# Patient Record
Sex: FEMALE | Race: WHITE | NOT HISPANIC OR LATINO | URBAN - METROPOLITAN AREA
[De-identification: names, ages, dates, MRNs, and addresses within clinical notes are randomized per-mention and may not be internally consistent; named-entity substitution may affect disease eponyms.]

---

## 2017-07-25 ENCOUNTER — HOSPITAL ENCOUNTER (OUTPATIENT)
Facility: MEDICAL CENTER | Age: 47
End: 2017-07-25
Attending: PHYSICIAN ASSISTANT
Payer: MEDICAID

## 2017-07-25 ENCOUNTER — OFFICE VISIT (OUTPATIENT)
Dept: MEDICAL GROUP | Facility: CLINIC | Age: 47
End: 2017-07-25
Payer: MEDICAID

## 2017-07-25 VITALS
SYSTOLIC BLOOD PRESSURE: 142 MMHG | TEMPERATURE: 97.3 F | DIASTOLIC BLOOD PRESSURE: 84 MMHG | BODY MASS INDEX: 34.78 KG/M2 | HEIGHT: 62 IN | HEART RATE: 80 BPM | RESPIRATION RATE: 16 BRPM | WEIGHT: 189 LBS | OXYGEN SATURATION: 97 %

## 2017-07-25 DIAGNOSIS — Z12.31 SCREENING MAMMOGRAM, ENCOUNTER FOR: ICD-10-CM

## 2017-07-25 DIAGNOSIS — K25.9 GASTRIC ULCER WITHOUT HEMORRHAGE OR PERFORATION, UNSPECIFIED CHRONICITY: ICD-10-CM

## 2017-07-25 LAB
ALBUMIN SERPL BCP-MCNC: 4 G/DL (ref 3.2–4.9)
ALBUMIN/GLOB SERPL: 1.1 G/DL
ALP SERPL-CCNC: 76 U/L (ref 30–99)
ALT SERPL-CCNC: 24 U/L (ref 2–50)
ANION GAP SERPL CALC-SCNC: 13 MMOL/L (ref 0–11.9)
AST SERPL-CCNC: 24 U/L (ref 12–45)
BILIRUB SERPL-MCNC: 1 MG/DL (ref 0.1–1.5)
BUN SERPL-MCNC: 12 MG/DL (ref 8–22)
CALCIUM SERPL-MCNC: 9.8 MG/DL (ref 8.5–10.5)
CHLORIDE SERPL-SCNC: 101 MMOL/L (ref 96–112)
CHOLEST SERPL-MCNC: 158 MG/DL (ref 100–199)
CO2 SERPL-SCNC: 24 MMOL/L (ref 20–33)
CREAT SERPL-MCNC: 0.56 MG/DL (ref 0.5–1.4)
CREAT UR-MCNC: 217.4 MG/DL
ERYTHROCYTE [DISTWIDTH] IN BLOOD BY AUTOMATED COUNT: 39.1 FL (ref 35.9–50)
GFR SERPL CREATININE-BSD FRML MDRD: >60 ML/MIN/1.73 M 2
GLOBULIN SER CALC-MCNC: 3.8 G/DL (ref 1.9–3.5)
GLUCOSE SERPL-MCNC: 224 MG/DL (ref 65–99)
HBA1C MFR BLD: 10.6 % (ref ?–5.8)
HCT VFR BLD AUTO: 47.6 % (ref 37–47)
HDLC SERPL-MCNC: 44 MG/DL
HGB BLD-MCNC: 16 G/DL (ref 12–16)
INT CON NEG: NEGATIVE
INT CON POS: POSITIVE
LDLC SERPL CALC-MCNC: 40 MG/DL
MCH RBC QN AUTO: 30.5 PG (ref 27–33)
MCHC RBC AUTO-ENTMCNC: 33.6 G/DL (ref 33.6–35)
MCV RBC AUTO: 90.8 FL (ref 81.4–97.8)
MICROALBUMIN UR-MCNC: 7.3 MG/DL
MICROALBUMIN/CREAT UR: 34 MG/G (ref 0–30)
PLATELET # BLD AUTO: 305 K/UL (ref 164–446)
PMV BLD AUTO: 10.9 FL (ref 9–12.9)
POTASSIUM SERPL-SCNC: 4 MMOL/L (ref 3.6–5.5)
PROT SERPL-MCNC: 7.8 G/DL (ref 6–8.2)
RBC # BLD AUTO: 5.24 M/UL (ref 4.2–5.4)
SODIUM SERPL-SCNC: 138 MMOL/L (ref 135–145)
TRIGL SERPL-MCNC: 368 MG/DL (ref 0–149)
WBC # BLD AUTO: 9.1 K/UL (ref 4.8–10.8)

## 2017-07-25 PROCEDURE — 82043 UR ALBUMIN QUANTITATIVE: CPT

## 2017-07-25 PROCEDURE — 80053 COMPREHEN METABOLIC PANEL: CPT

## 2017-07-25 PROCEDURE — 83036 HEMOGLOBIN GLYCOSYLATED A1C: CPT | Performed by: PHYSICIAN ASSISTANT

## 2017-07-25 PROCEDURE — 99204 OFFICE O/P NEW MOD 45 MIN: CPT | Performed by: PHYSICIAN ASSISTANT

## 2017-07-25 PROCEDURE — 82570 ASSAY OF URINE CREATININE: CPT

## 2017-07-25 PROCEDURE — 80061 LIPID PANEL: CPT

## 2017-07-25 PROCEDURE — 85027 COMPLETE CBC AUTOMATED: CPT

## 2017-07-25 RX ORDER — PRAVASTATIN SODIUM 10 MG
10 TABLET ORAL
Qty: 30 TAB | Refills: 11 | Status: SHIPPED | OUTPATIENT
Start: 2017-07-25 | End: 2017-07-25 | Stop reason: SDUPTHER

## 2017-07-25 RX ORDER — LISINOPRIL 10 MG/1
10 TABLET ORAL EVERY MORNING
Qty: 30 TAB | Refills: 5 | Status: SHIPPED | OUTPATIENT
Start: 2017-07-25 | End: 2017-07-25 | Stop reason: SDUPTHER

## 2017-07-25 RX ORDER — PRAVASTATIN SODIUM 10 MG
10 TABLET ORAL
Qty: 30 TAB | Refills: 11 | Status: SHIPPED | OUTPATIENT
Start: 2017-07-25

## 2017-07-25 RX ORDER — OMEPRAZOLE 20 MG/1
20 TABLET, DELAYED RELEASE ORAL EVERY MORNING
Qty: 30 TAB | Refills: 5 | Status: SHIPPED | OUTPATIENT
Start: 2017-07-25 | End: 2017-07-25 | Stop reason: SDUPTHER

## 2017-07-25 RX ORDER — OMEPRAZOLE 20 MG/1
20 TABLET, DELAYED RELEASE ORAL EVERY MORNING
Qty: 30 TAB | Refills: 5 | Status: SHIPPED | OUTPATIENT
Start: 2017-07-25 | End: 2017-08-22

## 2017-07-25 RX ORDER — LANCETS 30 GAUGE
EACH MISCELLANEOUS
Qty: 120 EACH | Refills: 2 | Status: SHIPPED | OUTPATIENT
Start: 2017-07-25

## 2017-07-25 RX ORDER — LISINOPRIL 10 MG/1
10 TABLET ORAL EVERY MORNING
Qty: 30 TAB | Refills: 5 | Status: SHIPPED | OUTPATIENT
Start: 2017-07-25 | End: 2018-01-26 | Stop reason: SDUPTHER

## 2017-07-25 RX ORDER — LANCETS 30 GAUGE
EACH MISCELLANEOUS
Qty: 120 EACH | Refills: 2 | Status: SHIPPED | OUTPATIENT
Start: 2017-07-25 | End: 2017-07-25 | Stop reason: SDUPTHER

## 2017-07-25 ASSESSMENT — PATIENT HEALTH QUESTIONNAIRE - PHQ9: CLINICAL INTERPRETATION OF PHQ2 SCORE: 0

## 2017-07-25 NOTE — MR AVS SNAPSHOT
"        Namita Tsai   2017 1:00 PM   Office Visit   MRN: [id number]    Department:  [facility] Phone:  [phone]    Description:  Female : 1970   Provider:  Sylvia Ponce PA-C           Reason for Visit     New Patient diabetes type II / hypertension      Allergies as of 2017     Allergen Noted Reactions    Pcn [Penicillins] 2017         You were diagnosed with     Uncontrolled type 2 diabetes mellitus with complication, without long-term current use of insulin (CMS-HCC)   [8939885]       Screening mammogram, encounter for   [7624825]       Gastric ulcer without hemorrhage or perforation, unspecified chronicity   [1855486]         Vital Signs     Blood Pressure Pulse Temperature Respirations Height Weight    142/84 mmHg 80 36.3 °C (97.3 °F) 16 1.565 m (5' 1.61\") 85.73 kg (189 lb)    Body Mass Index Oxygen Saturation Last Menstrual Period Smoking Status          35.00 kg/m2 97% 2007 Former Smoker        Basic Information     Date Of Birth Sex Race Ethnicity Preferred Language    1970 Female White Non- English      Problem List              ICD-10-CM Priority Class Noted - Resolved    Uncontrolled type 2 diabetes mellitus with complication, without long-term current use of insulin (CMS-HCC) E11.8, E11.65   2017 - Present    Gastric ulcer without hemorrhage or perforation K25.9   2017 - Present      Health Maintenance     Patient has no pending health maintenance at this time      Results     POCT Hemoglobin A1C      Component    Glycohemoglobin    10.6    Internal Control Negative    Negative    Internal Control Positive    Positive                        Current Immunizations     No immunizations on file.      Below and/or attached are the medications your provider expects you to take. Review all of your home medications and newly ordered medications with your provider and/or pharmacist. Follow medication instructions as directed by your " provider and/or pharmacist. Please keep your medication list with you and share with your provider. Update the information when medications are discontinued, doses are changed, or new medications (including over-the-counter products) are added; and carry medication information at all times in the event of emergency situations     Allergies:  PCN - (reactions not documented)               Medications  Valid as of: July 25, 2017 -  2:29 PM    Generic Name Brand Name Tablet Size Instructions for use    Blood Glucose Monitoring Suppl (Device) Blood Glucose Monitoring Suppl  Meter: Dispense Device of Insurance Preference. Sig. Use as directed for blood sugar monitoring. #1. NR.        Blood Glucose Monitoring Suppl (Misc) Blood Glucose Monitoring Suppl SUPPLIES Insurance preferred test strips        Lancets (Misc) Lancets  Lancets order: Lancets for insurance preferred meter. Sig: use QAM fasting and prn ssx high or low sugar. #100 RF x 3        Lisinopril (Tab) PRINIVIL 10 MG Take 1 Tab by mouth every morning.        MetFORMIN HCl (Tab) GLUCOPHAGE 1000 MG Take 1 Tab by mouth 2 times a day, with meals.        Omeprazole Magnesium (Tablet Delayed Response) PRILOSEC OTC 20 MG Take 1 Tab by mouth every morning.        Pravastatin Sodium (Tab) PRAVACHOL 10 MG Take 1 Tab by mouth every bedtime.        .                 Medicines prescribed today were sent to:     \[facility]\"" [facility] #[id number] - [place], [place] - [address]    [address] [facility] [address]    Phone: [phone] Fax: [phone]    Open 24 Hours?: No    [facility] - [place], [place] - [address] [facility] [address]    Phone: [phone] Fax: [phone]    Open 24 Hours?: No      Medication refill instructions:       If your prescription bottle indicates you have medication refills left, it is not necessary to call your provider’s office. Please contact your pharmacy and they will refill your medication.    If your  prescription bottle indicates you do not have any refills left, you may request refills at any time through one of the following ways: The online etouches system (except Urgent Care), by calling your provider’s office, or by asking your pharmacy to contact your provider’s office with a refill request. Medication refills are processed only during regular business hours and may not be available until the next business day. Your provider may request additional information or to have a follow-up visit with you prior to refilling your medication.   *Please Note: Medication refills are assigned a new Rx number when refilled electronically. Your pharmacy may indicate that no refills were authorized even though a new prescription for the same medication is available at the pharmacy. Please request the medicine by name with the pharmacy before contacting your provider for a refill.        Your To Do List     Future Labs/Procedures Complete By Expires    CBC WITHOUT DIFFERENTIAL  As directed 1/25/2018    [id number]  As directed 7/25/2018    MICROALBUMIN CREAT RATIO URINE  As directed 7/25/2018      Instructions    Diabetes Mellitus and Food  It is important for you to manage your blood sugar (glucose) level. Your blood glucose level can be greatly affected by what you eat. Eating healthier foods in the appropriate amounts throughout the day at about the same time each day will help you control your blood glucose level. It can also help slow or prevent worsening of your diabetes mellitus. Healthy eating may even help you improve the level of your blood pressure and reach or maintain a healthy weight.   General recommendations for healthful eating and cooking habits include:  · Eating meals and snacks regularly. Avoid going long periods of time without eating to lose weight.  · Eating a diet that consists mainly of plant-based foods, such as fruits, vegetables, nuts, legumes, and whole grains.  · Using low-heat  cooking methods, such as baking, instead of high-heat cooking methods, such as deep frying.  Work with your dietitian to make sure you understand how to use the Nutrition Facts information on food labels.  HOW CAN FOOD AFFECT ME?  Carbohydrates  Carbohydrates affect your blood glucose level more than any other type of food. Your dietitian will help you determine how many carbohydrates to eat at each meal and teach you how to count carbohydrates. Counting carbohydrates is important to keep your blood glucose at a healthy level, especially if you are using insulin or taking certain medicines for diabetes mellitus.  Alcohol  Alcohol can cause sudden decreases in blood glucose (hypoglycemia), especially if you use insulin or take certain medicines for diabetes mellitus. Hypoglycemia can be a life-threatening condition. Symptoms of hypoglycemia (sleepiness, dizziness, and disorientation) are similar to symptoms of having too much alcohol.   If your health care provider has given you approval to drink alcohol, do so in moderation and use the following guidelines:  · Women should not have more than one drink per day, and men should not have more than two drinks per day. One drink is equal to:  ¨ 12 oz of beer.  ¨ 5 oz of wine.  ¨ 1½ oz of hard liquor.  · Do not drink on an empty stomach.  · Keep yourself hydrated. Have water, diet soda, or unsweetened iced tea.  · Regular soda, juice, and other mixers might contain a lot of carbohydrates and should be counted.  WHAT FOODS ARE NOT RECOMMENDED?  As you make food choices, it is important to remember that all foods are not the same. Some foods have fewer nutrients per serving than other foods, even though they might have the same number of calories or carbohydrates. It is difficult to get your body what it needs when you eat foods with fewer nutrients. Examples of foods that you should avoid that are high in calories and carbohydrates but low in nutrients include:  · Trans  fats (most processed foods list trans fats on the Nutrition Facts label).  · Regular soda.  · Juice.  · Candy.  · Sweets, such as cake, pie, doughnuts, and cookies.  · Fried foods.  WHAT FOODS CAN I EAT?  Eat nutrient-rich foods, which will nourish your body and keep you healthy. The food you should eat also will depend on several factors, including:  · The calories you need.  · The medicines you take.  · Your weight.  · Your blood glucose level.  · Your blood pressure level.  · Your cholesterol level.  You should eat a variety of foods, including:  · Protein.  ¨ Lean cuts of meat.  ¨ Proteins low in saturated fats, such as fish, egg whites, and beans. Avoid processed meats.  · Fruits and vegetables.  ¨ Fruits and vegetables that may help control blood glucose levels, such as apples, mangoes, and yams.  · Dairy products.  ¨ Choose fat-free or low-fat dairy products, such as milk, yogurt, and cheese.  · Grains, bread, pasta, and rice.  ¨ Choose whole grain products, such as multigrain bread, whole oats, and brown rice. These foods may help control blood pressure.  · Fats.  ¨ Foods containing healthful fats, such as nuts, avocado, olive oil, canola oil, and fish.  DOES EVERYONE WITH DIABETES MELLITUS HAVE THE SAME MEAL PLAN?  Because every person with diabetes mellitus is different, there is not one meal plan that works for everyone. It is very important that you meet with a dietitian who will help you create a meal plan that is just right for you.     This information is not intended to replace advice given to you by your health care provider. Make sure you discuss any questions you have with your health care provider.     Document Released: 09/14/2006 Document Revised: 01/08/2016 Document Reviewed: 11/14/2014  [employer] Interactive Patient Education ©2016 [employer]        Diabetes and Exercise  Exercising regularly is important. It is not just about losing weight. It has many health benefits, such  as:  · Improving your overall fitness, flexibility, and endurance.  · Increasing your bone density.  · Helping with weight control.  · Decreasing your body fat.  · Increasing your muscle strength.  · Reducing stress and tension.  · Improving your overall health.  People with diabetes who exercise gain additional benefits because exercise:  · Reduces appetite.  · Improves the body's use of blood sugar (glucose).  · Helps lower or control blood glucose.  · Decreases blood pressure.  · Helps control blood lipids (such as cholesterol and triglycerides).  · Improves the body's use of the hormone insulin by:  ¨ Increasing the body's insulin sensitivity.  ¨ Reducing the body's insulin needs.  · Decreases the risk for heart disease because exercising:  ¨ Lowers cholesterol and triglycerides levels.  ¨ Increases the levels of good cholesterol (such as high-density lipoproteins [HDL]) in the body.  ¨ Lowers blood glucose levels.  YOUR ACTIVITY PLAN   Choose an activity that you enjoy, and set realistic goals. To exercise safely, you should begin practicing any new physical activity slowly, and gradually increase the intensity of the exercise over time. Your health care provider or diabetes educator can help create an activity plan that works for you. General recommendations include:  · Encouraging children to engage in at least 60 minutes of physical activity each day.  · Stretching and performing strength training exercises, such as yoga or weight lifting, at least 2 times per week.  · Performing a total of at least 150 minutes of moderate-intensity exercise each week, such as brisk walking or water aerobics.  · Exercising at least 3 days per week, making sure you allow no more than 2 consecutive days to pass without exercising.  · Avoiding long periods of inactivity (90 minutes or more). When you have to spend an extended period of time sitting down, take frequent breaks to walk or stretch.  RECOMMENDATIONS FOR EXERCISING  WITH TYPE 1 OR TYPE 2 DIABETES   · Check your blood glucose before exercising. If blood glucose levels are greater than 240 mg/dL, check for urine ketones. Do not exercise if ketones are present.  · Avoid injecting insulin into areas of the body that are going to be exercised. For example, avoid injecting insulin into:  · The arms when playing tennis.  · The legs when jogging.  · Keep a record of:  · Food intake before and after you exercise.  · Expected peak times of insulin action.  · Blood glucose levels before and after you exercise.  · The type and amount of exercise you have done.  · Review your records with your health care provider. Your health care provider will help you to develop guidelines for adjusting food intake and insulin amounts before and after exercising.  · If you take insulin or oral hypoglycemic agents, watch for signs and symptoms of hypoglycemia. They include:  · Dizziness.  · Shaking.  · Sweating.  · Chills.  · Confusion.  · Drink plenty of water while you exercise to prevent dehydration or heat stroke. Body water is lost during exercise and must be replaced.  · Talk to your health care provider before starting an exercise program to make sure it is safe for you. Remember, almost any type of activity is better than none.     This information is not intended to replace advice given to you by your health care provider. Make sure you discuss any questions you have with your health care provider.     Document Released: 03/09/2005 Document Revised: 05/03/2016 Document Reviewed: 05/27/2014  [employer] Interactive Patient Education ©2016 [employer]            MyChart Status: Patient Declined

## 2017-07-25 NOTE — ASSESSMENT & PLAN NOTE
A few years ago, patient had pain in her epigastric region with eating and was diagnosed with gastric ulcers after scoping was performed inpatient. She was placed on omeprazole after that time. She states that her pain has started to return and she would like to start this medication again

## 2017-07-25 NOTE — PROGRESS NOTES
"Chief Complaint   Patient presents with   • New Patient     diabetes type II / hypertension       HISTORY OF THE PRESENT ILLNESS: This is a 46 y.o. female new patient to our practice who presents today for evaluation and management of:    Uncontrolled type 2 diabetes mellitus with complication, without long-term current use of insulin (CMS-[facility])  Patient was diagnosed with diabetes many years ago however she has been without her metformin, lisinopril and other medications since at least last December, 2016. She would like to restart this medication and get herself \"healthy\"    Last A1c: 10.8% today  DM Medications: metformin Patient reports poor medication compliance.   HTN: Blood pressure goal <140/<90 No  ACE: lisinopril will start today  Hyperlipidemia: Cholesterol goal LDL <100 unknown   Currently Rx Statin: none currently will start pravastatin   Diabetic diet: No  Exercise: walks but infrequently  Last monofilament foot exam: Checks feet at home: No, no sores currently   Last Eye exam: 7/2017   Kidney function: no recent test  Microalbumin screening: no recent test  Has patient received flu vaccine: No  Has patient received Hep B series:unknown    A1c goal <7 No  Current barriers to control include monetary and transport  Glucose monitoring frequency: none recently, lost device  Hypoglycemic episodes No  Diabetic complications: none    The patient is not taking ASA every day and is not taking all other medications as prescribed. Patient denies any side effects of medication.      Gastric ulcer without hemorrhage or perforation  A few years ago, patient had pain in her epigastric region with eating and was diagnosed with gastric ulcers after scoping was performed inpatient. She was placed on omeprazole after that time. She states that her pain has started to return and she would like to start this medication again        History reviewed. No pertinent past medical history.    Past Surgical History   Procedure " Laterality Date   • Cholecystectomy     • Appendectomy         Family Status   Relation Status Death Age   • Brother Alive    • Mother     • Father     • Maternal Grandmother       Family History   Problem Relation Age of Onset   • Diabetes Father    • Cancer Father    • Hypertension Father    • Diabetes Maternal Grandmother    • Hypertension Maternal Grandmother    • Heart Attack Maternal Grandmother    • Arrythmia Brother    • Heart Attack Brother        Social History   Substance Use Topics   • Smoking status: Former Smoker     Types: Cigarettes     Quit date: 2009   • Smokeless tobacco: Never Used   • Alcohol Use: No      Comment: occasional       Allergies: Pcn    Current Outpatient Prescriptions Ordered in [facility]   Medication Sig Dispense Refill   • Blood Glucose Monitoring Suppl Device Meter: Dispense Device of Insurance Preference. Sig. Use as directed for blood sugar monitoring. #1. NR. 1 Device 0   • Blood Glucose Monitoring Suppl SUPPLIES Misc Insurance preferred test strips 120 Strip 3   • Lancets Misc Lancets order: Lancets for insurance preferred meter. Sig: use QAM fasting and prn ssx high or low sugar. #100 RF x 3 120 Each 2   • lisinopril (PRINIVIL) 10 MG Tab Take 1 Tab by mouth every morning. 30 Tab 5   • metformin (GLUCOPHAGE) 1000 MG tablet Take 1 Tab by mouth 2 times a day, with meals. 60 Tab 5   • omeprazole (PRILOSEC OTC) 20 MG tablet Take 1 Tab by mouth every morning. 30 Tab 5   • pravastatin (PRAVACHOL) 10 MG Tab Take 1 Tab by mouth every bedtime. 30 Tab 11     No current [facility]-ordered facility-administered medications on file.     Review of Systems:   Constitutional: Negative for fever, chills, unplanned weight change and malaise/fatigue.   HENT: Negative for ear pain or tinnitus, nosebleeds, congestion, sore throat and neck pain.    Eyes: Positive for blurred or decreased vision.   Respiratory: Negative for cough, sputum production, shortness of breath and  "wheezing.    Cardiovascular: Negative for chest pain, palpitations, orthopnea, syncope and leg swelling.   Gastrointestinal: Positive for epigastric pain. Negative for black bloody and tarry stools. Negative for heartburn, nausea, vomiting and abdominal pain.   Genitourinary: Negative for dysuria, urgency and frequency.   Musculoskeletal: Negative for myalgias, back pain and joint pain.   Skin: Negative for rash, itching, nail changes or skin changes.   Neurological: Positive for bilateral sensory changes of feet. Negative for dizziness, tremors, focal weakness, tingling and headaches.   Endo/Heme/Allergies: Does not bruise/bleed easily.    Psychiatric/Behavioral: Negative for depression, suicidal ideas and memory loss. The patient is not nervous/anxious and does not have insomnia.  Pt does not use recreational drugs or excessive alcohol.     Exam:  Blood pressure 142/84, pulse 80, temperature 36.3 °C (97.3 °F), resp. rate 16, height 1.565 m (5' 1.61\"), weight 85.73 kg (189 lb), last menstrual period 07/01/2007, SpO2 97 %.  General:  Obese female in NAD  Eyes: Conjunctiva clear, lids without ptosis, pupils equal and reactive to light accommodation.  ENMT: Severe dental caries. Nose and lips without deformity. Nasal mucosa and septum pink without evidence of purulent drainage. External auditory canals clear of excessive cerumen. Tympanic membranes pearly grey without bulge or visible fluid line. Oropharynx pink without lesions or edema.   Neck: Supple without masses upon palpation. Thyroid is not visibly enlarged.  Pulmonary: Clear to ausculation. Normal effort. No rales, ronchi, or wheezing upon auscultation.   Cardiovascular: Regular rate and rhythm without murmur. Carotid and radial pulses are intact and equal bilaterally.   Extremities: No clubbing or cyanosis. Bilateral upper and lower extremities without edema.   GI: Normoactive bowel sounds in all 4 quadrants. Soft, nontender, nondistended. Without palpable " hepatosplenomegaly.   Neurologic: Deep tendon reflexes 2+/4 bilaterally.   Skin: Warm and dry.  Sunburn bilateral shoulders.   Musculoskeletal: Normal gait.   Psych: Normal mood and affect. Alert and oriented x3. Judgment and insight is normal.    Medical Decision Making & Discussion:   1. Uncontrolled type 2 diabetes mellitus with complication, without long-term current use of insulin (CMS-[facility])  New labs and medications orders completed today. Patient was thoroughly educated on diet including no suragy drinks, juices, breads, pasta, rice, potato. Increase protein intake and increase number of small meals to 5 times per day. Advised to always eat food while taking metformin. Diabetes sugar journal given to patient to keep a record of daily AM fasting glucose as well as random pre and post-prandial glucose for the next 4 weeks. Patient advised to start the following medications at least 1 week apart so that if she should have a reaction she will know which medication caused that reaction.   - metformin (GLUCOPHAGE) 1000 MG tablet; Take 1 Tab by mouth 2 times a day, with meals.  Dispense: 60 Tab; Refill: 5  - Lancets Misc; Lancets order: Lancets for insurance preferred meter. Sig: use QAM fasting and prn ssx high or low sugar. #100 RF x 3  Dispense: 120 Each; Refill: 2  - Blood Glucose Monitoring Suppl Device; Meter: Dispense Device of Insurance Preference. Sig. Use as directed for blood sugar monitoring. #1. NR.  Dispense: 1 Device; Refill: 0  - Blood Glucose Monitoring Suppl SUPPLIES Misc; Insurance preferred test strips  Dispense: 120 Strip; Refill: 3  - CMP (12)  - CBC WITHOUT DIFFERENTIAL; Future  - LIPID PANEL  - POCT Hemoglobin A1C  - MICROALBUMIN CREAT RATIO URINE; Future  - lisinopril (PRINIVIL) 10 MG Tab; Take 1 Tab by mouth every morning.  Dispense: 30 Tab; Refill: 5  - pravastatin (PRAVACHOL) 10 MG Tab; Take 1 Tab by mouth every bedtime.  Dispense: 30 Tab; Refill: 11    2. Screening mammogram, encounter  for  - [id number]; Future    3. Gastric ulcer without hemorrhage or perforation, unspecified chronicity  - omeprazole (PRILOSEC OTC) 20 MG tablet; Take 1 Tab by mouth every morning.  Dispense: 30 Tab; Refill: 5    Follow up in 4 weeks for diabetes recheck.

## 2017-07-25 NOTE — PATIENT INSTRUCTIONS
Diabetes Mellitus and Food  It is important for you to manage your blood sugar (glucose) level. Your blood glucose level can be greatly affected by what you eat. Eating healthier foods in the appropriate amounts throughout the day at about the same time each day will help you control your blood glucose level. It can also help slow or prevent worsening of your diabetes mellitus. Healthy eating may even help you improve the level of your blood pressure and reach or maintain a healthy weight.   General recommendations for healthful eating and cooking habits include:  · Eating meals and snacks regularly. Avoid going long periods of time without eating to lose weight.  · Eating a diet that consists mainly of plant-based foods, such as fruits, vegetables, nuts, legumes, and whole grains.  · Using low-heat cooking methods, such as baking, instead of high-heat cooking methods, such as deep frying.  Work with your dietitian to make sure you understand how to use the Nutrition Facts information on food labels.  HOW CAN FOOD AFFECT ME?  Carbohydrates  Carbohydrates affect your blood glucose level more than any other type of food. Your dietitian will help you determine how many carbohydrates to eat at each meal and teach you how to count carbohydrates. Counting carbohydrates is important to keep your blood glucose at a healthy level, especially if you are using insulin or taking certain medicines for diabetes mellitus.  Alcohol  Alcohol can cause sudden decreases in blood glucose (hypoglycemia), especially if you use insulin or take certain medicines for diabetes mellitus. Hypoglycemia can be a life-threatening condition. Symptoms of hypoglycemia (sleepiness, dizziness, and disorientation) are similar to symptoms of having too much alcohol.   If your health care provider has given you approval to drink alcohol, do so in moderation and use the following guidelines:  · Women should not have more than one drink per day, and men  should not have more than two drinks per day. One drink is equal to:  ¨ 12 oz of beer.  ¨ 5 oz of wine.  ¨ 1½ oz of hard liquor.  · Do not drink on an empty stomach.  · Keep yourself hydrated. Have water, diet soda, or unsweetened iced tea.  · Regular soda, juice, and other mixers might contain a lot of carbohydrates and should be counted.  WHAT FOODS ARE NOT RECOMMENDED?  As you make food choices, it is important to remember that all foods are not the same. Some foods have fewer nutrients per serving than other foods, even though they might have the same number of calories or carbohydrates. It is difficult to get your body what it needs when you eat foods with fewer nutrients. Examples of foods that you should avoid that are high in calories and carbohydrates but low in nutrients include:  · Trans fats (most processed foods list trans fats on the Nutrition Facts label).  · Regular soda.  · Juice.  · Candy.  · Sweets, such as cake, pie, doughnuts, and cookies.  · Fried foods.  WHAT FOODS CAN I EAT?  Eat nutrient-rich foods, which will nourish your body and keep you healthy. The food you should eat also will depend on several factors, including:  · The calories you need.  · The medicines you take.  · Your weight.  · Your blood glucose level.  · Your blood pressure level.  · Your cholesterol level.  You should eat a variety of foods, including:  · Protein.  ¨ Lean cuts of meat.  ¨ Proteins low in saturated fats, such as fish, egg whites, and beans. Avoid processed meats.  · Fruits and vegetables.  ¨ Fruits and vegetables that may help control blood glucose levels, such as apples, mangoes, and yams.  · Dairy products.  ¨ Choose fat-free or low-fat dairy products, such as milk, yogurt, and cheese.  · Grains, bread, pasta, and rice.  ¨ Choose whole grain products, such as multigrain bread, whole oats, and brown rice. These foods may help control blood pressure.  · Fats.  ¨ Foods containing healthful fats, such as nuts,  avocado, olive oil, canola oil, and fish.  DOES EVERYONE WITH DIABETES MELLITUS HAVE THE SAME MEAL PLAN?  Because every person with diabetes mellitus is different, there is not one meal plan that works for everyone. It is very important that you meet with a dietitian who will help you create a meal plan that is just right for you.     This information is not intended to replace advice given to you by your health care provider. Make sure you discuss any questions you have with your health care provider.     Document Released: 09/14/2006 Document Revised: 01/08/2016 Document Reviewed: 11/14/2014  [employer] Interactive Patient Education ©2016 [employer]        Diabetes and Exercise  Exercising regularly is important. It is not just about losing weight. It has many health benefits, such as:  · Improving your overall fitness, flexibility, and endurance.  · Increasing your bone density.  · Helping with weight control.  · Decreasing your body fat.  · Increasing your muscle strength.  · Reducing stress and tension.  · Improving your overall health.  People with diabetes who exercise gain additional benefits because exercise:  · Reduces appetite.  · Improves the body's use of blood sugar (glucose).  · Helps lower or control blood glucose.  · Decreases blood pressure.  · Helps control blood lipids (such as cholesterol and triglycerides).  · Improves the body's use of the hormone insulin by:  ¨ Increasing the body's insulin sensitivity.  ¨ Reducing the body's insulin needs.  · Decreases the risk for heart disease because exercising:  ¨ Lowers cholesterol and triglycerides levels.  ¨ Increases the levels of good cholesterol (such as high-density lipoproteins [HDL]) in the body.  ¨ Lowers blood glucose levels.  YOUR ACTIVITY PLAN   Choose an activity that you enjoy, and set realistic goals. To exercise safely, you should begin practicing any new physical activity slowly, and gradually increase the intensity of the exercise  over time. Your health care provider or diabetes educator can help create an activity plan that works for you. General recommendations include:  · Encouraging children to engage in at least 60 minutes of physical activity each day.  · Stretching and performing strength training exercises, such as yoga or weight lifting, at least 2 times per week.  · Performing a total of at least 150 minutes of moderate-intensity exercise each week, such as brisk walking or water aerobics.  · Exercising at least 3 days per week, making sure you allow no more than 2 consecutive days to pass without exercising.  · Avoiding long periods of inactivity (90 minutes or more). When you have to spend an extended period of time sitting down, take frequent breaks to walk or stretch.  RECOMMENDATIONS FOR EXERCISING WITH TYPE 1 OR TYPE 2 DIABETES   · Check your blood glucose before exercising. If blood glucose levels are greater than 240 mg/dL, check for urine ketones. Do not exercise if ketones are present.  · Avoid injecting insulin into areas of the body that are going to be exercised. For example, avoid injecting insulin into:  · The arms when playing tennis.  · The legs when jogging.  · Keep a record of:  · Food intake before and after you exercise.  · Expected peak times of insulin action.  · Blood glucose levels before and after you exercise.  · The type and amount of exercise you have done.  · Review your records with your health care provider. Your health care provider will help you to develop guidelines for adjusting food intake and insulin amounts before and after exercising.  · If you take insulin or oral hypoglycemic agents, watch for signs and symptoms of hypoglycemia. They include:  · Dizziness.  · Shaking.  · Sweating.  · Chills.  · Confusion.  · Drink plenty of water while you exercise to prevent dehydration or heat stroke. Body water is lost during exercise and must be replaced.  · Talk to your health care provider before  starting an exercise program to make sure it is safe for you. Remember, almost any type of activity is better than none.     This information is not intended to replace advice given to you by your health care provider. Make sure you discuss any questions you have with your health care provider.     Document Released: 03/09/2005 Document Revised: 05/03/2016 Document Reviewed: 05/27/2014  [employer]Â® Interactive Patient Education ©2016 [employer]vier Inc.

## 2017-07-25 NOTE — ASSESSMENT & PLAN NOTE
"Patient was diagnosed with diabetes many years ago however she has been without her metformin, lisinopril and other medications since at least last December, 2016. She would like to restart this medication and get herself \"healthy\"    Last A1c: 10.8% today  DM Medications: metformin Patient reports poor medication compliance.   HTN: Blood pressure goal <140/<90 No  ACE: lisinopril will start today  Hyperlipidemia: Cholesterol goal LDL <100 unknown   Currently Rx Statin: none currently will start pravastatin   Diabetic diet: No  Exercise: walks but infrequently  Last monofilament foot exam: Checks feet at home: No, no sores currently   Last Eye exam: 7/2017   Kidney function: no recent test  Microalbumin screening: no recent test  Has patient received flu vaccine: No  Has patient received Hep B series:unknown    A1c goal <7 No  Current barriers to control include monetary and transport  Glucose monitoring frequency: none recently, lost device  Hypoglycemic episodes No  Diabetic complications: none    The patient is not taking ASA every day and is not taking all other medications as prescribed. Patient denies any side effects of medication.    "

## 2017-07-26 NOTE — PROGRESS NOTES
Quick Note:    Thank you for coming in yesterday and having your labs drawn. We already have the results! As expected, your blood sugar was elevated. Your kidneys are working well but because of the amount of sugar in your blood lately they are spilling protein into your urine. This should normalize once we have your blood sugars under better control. Please take your Metformin twice per day with meals, start your lisinopril every morning, and adjust your diet as discussed, reducing your sugar and carbohydrate intake.  ______

## 2017-08-22 ENCOUNTER — OFFICE VISIT (OUTPATIENT)
Dept: MEDICAL GROUP | Facility: CLINIC | Age: 47
End: 2017-08-22
Payer: MEDICAID

## 2017-08-22 VITALS
HEART RATE: 90 BPM | HEIGHT: 61 IN | RESPIRATION RATE: 18 BRPM | TEMPERATURE: 97.3 F | SYSTOLIC BLOOD PRESSURE: 90 MMHG | WEIGHT: 189 LBS | OXYGEN SATURATION: 91 % | DIASTOLIC BLOOD PRESSURE: 60 MMHG | BODY MASS INDEX: 35.68 KG/M2

## 2017-08-22 DIAGNOSIS — K25.9 GASTRIC ULCER WITHOUT HEMORRHAGE OR PERFORATION, UNSPECIFIED CHRONICITY: ICD-10-CM

## 2017-08-22 PROCEDURE — 99212 OFFICE O/P EST SF 10 MIN: CPT | Performed by: PHYSICIAN ASSISTANT

## 2017-08-22 RX ORDER — ESOMEPRAZOLE MAGNESIUM 40 MG/1
40 CAPSULE, DELAYED RELEASE ORAL
Qty: 90 CAP | Refills: 1 | Status: SHIPPED | OUTPATIENT
Start: 2017-08-22

## 2017-08-22 RX ORDER — GLIPIZIDE 10 MG/1
10 TABLET ORAL 2 TIMES DAILY
Qty: 60 TAB | Refills: 2 | Status: SHIPPED | OUTPATIENT
Start: 2017-08-22 | End: 2017-11-06 | Stop reason: SDUPTHER

## 2017-08-22 ASSESSMENT — PAIN SCALES - GENERAL: PAINLEVEL: NO PAIN

## 2017-08-22 NOTE — ASSESSMENT & PLAN NOTE
Last A1c: 10.8% 2017  DM Medications: metformin 1000mg BID Patient reports good medication compliance.   HTN: Blood pressure goal <140/<90 Yes  ACE: lisinopril   Hyperlipidemia: Cholesterol goal LDL <100 Yes.   Currently Rx Statin: pravastatin  Diabetic diet: Yes  Exercise: walking 20 or more min per day.   Last monofilament foot exam: 2017 Checks feet at home: Yes, no sores currently   Last Eye exam: 2017   Kidney function: GFR wnl 2017  Microalbumin screenin, elevated on 2017  Has patient received flu vaccine: No  Has patient received Hep B series:No    A1c goal <7 No  Current barriers to control include regaining access to healthcare.   Glucose monitoring frequency: BID  Fasting sugars: 211-260. Post-prandial sugars: 300  Hypoglycemic episodes No  Diabetic complications: none known at this time    The patient is not taking ASA every day and is taking all other medications as prescribed. Patient denies any side effects of medication.

## 2017-08-22 NOTE — MR AVS SNAPSHOT
"Namita Tsai   2017 2:20 PM   Office Visit   MRN: [id number]    Department:  [facility]   Dept Phone:  [phone]    Description:  Female : 1970   Provider:  Sylvia oPnce PA-C           Reason for Visit     Diabetes     Results labs done on       Allergies as of 2017     Allergen Noted Reactions    Pcn [Penicillins] 2017         You were diagnosed with     Uncontrolled type 2 diabetes mellitus with complication, without long-term current use of insulin (CMS-HCC)   [[id number]]         Vital Signs     Blood Pressure Pulse Temperature Respirations Height Weight    90/60 mmHg 90 36.3 °C (97.3 °F) 18 1.549 m (5' 0.98\") 85.73 kg (189 lb)    Body Mass Index Oxygen Saturation Last Menstrual Period Smoking Status          35.73 kg/m2 91% 2007 Former Smoker        Basic Information     Date Of Birth Sex Race Ethnicity Preferred Language    1970 Female White Non- English      Your appointments     Sep 25, 2017 10:00 AM   Established Patient with Sylvia Ponce PA-C   [facility] (--)    [address] [place] [id number]   [phone]           You will be receiving a confirmation call a few days before your appointment from our automated call confirmation system.              Problem List              ICD-10-CM Priority Class Noted - Resolved    Uncontrolled type 2 diabetes mellitus with complication, without long-term current use of insulin (CMS-HCC) E11.8, E11.65   2017 - Present    Gastric ulcer without hemorrhage or perforation K25.9   2017 - Present      Health Maintenance        Date Due Completion Dates    IMM HEP B VACCINE (1 of 3 - Primary Series) 1970 ---    DIABETES MONOFILAMENT / LE EXAM 3/25/1970 ---    RETINAL SCREENING 1987 ---    IMM DTaP/Tdap/Td Vaccine (1 - Tdap) 1988 ---    IMM PNEUMOCOCCAL 19-64 (ADULT) MEDIUM RISK SERIES (1 of 1 - PPSV23) 1988 ---    PAP SMEAR " 9/25/1990 ---    MAMMOGRAM 9/25/2009 ---    IMM INFLUENZA (1) 9/1/2017 ---    A1C SCREENING 1/25/2018 7/25/2017    FASTING LIPID PROFILE 7/25/2018 7/25/2017    URINE ACR / MICROALBUMIN 7/25/2018 7/25/2017    SERUM CREATININE 7/25/2018 7/25/2017            Current Immunizations     No immunizations on file.      Below and/or attached are the medications your provider expects you to take. Review all of your home medications and newly ordered medications with your provider and/or pharmacist. Follow medication instructions as directed by your provider and/or pharmacist. Please keep your medication list with you and share with your provider. Update the information when medications are discontinued, doses are changed, or new medications (including over-the-counter products) are added; and carry medication information at all times in the event of emergency situations     Allergies:  PCN - (reactions not documented)               Medications  Valid as of: August 22, 2017 -  3:06 PM    Generic Name Brand Name Tablet Size Instructions for use    Blood Glucose Monitoring Suppl (Device) Blood Glucose Monitoring Suppl  Meter: Dispense Device of Insurance Preference. Sig. Use as directed for blood sugar monitoring. #1. NR.        Blood Glucose Monitoring Suppl (Misc) Blood Glucose Monitoring Suppl Supplies Insurance preferred test strips        Lancets (Misc) Lancets  Lancets order: Lancets for insurance preferred meter. Sig: use QAM fasting and prn ssx high or low sugar. #100 RF x 3        Lisinopril (Tab) PRINIVIL 10 MG Take 1 Tab by mouth every morning.        MetFORMIN HCl (Tab) GLUCOPHAGE 1000 MG Take 1 Tab by mouth 2 times a day, with meals.        Pravastatin Sodium (Tab) PRAVACHOL 10 MG Take 1 Tab by mouth every bedtime.        .                 Medicines prescribed today were sent to:     [facility] - [place], [place] - [address] [facility]    [address] [place] [address]    Phone:  [phone] Fax: [phone]    Open 24 Hours?: No      Medication refill instructions:       If your prescription bottle indicates you have medication refills left, it is not necessary to call your provider’s office. Please contact your pharmacy and they will refill your medication.    If your prescription bottle indicates you do not have any refills left, you may request refills at any time through one of the following ways: The online [employer] system (except Urgent Care), by calling your provider’s office, or by asking your pharmacy to contact your provider’s office with a refill request. Medication refills are processed only during regular business hours and may not be available until the next business day. Your provider may request additional information or to have a follow-up visit with you prior to refilling your medication.   *Please Note: Medication refills are assigned a new Rx number when refilled electronically. Your pharmacy may indicate that no refills were authorized even though a new prescription for the same medication is available at the pharmacy. Please request the medicine by name with the pharmacy before contacting your provider for a refill.           iodinehart Status: Patient Declined

## 2017-08-23 NOTE — PROGRESS NOTES
Chief Complaint   Patient presents with   • Diabetes   • Results     labs done on        HISTORY OF PRESENT ILLNESS: Patient is a 46 y.o. female established patient who presents today for evaluation and management of:    Uncontrolled type 2 diabetes mellitus with complication, without long-term current use of insulin (CMS-HCC)  Last A1c: 10.8% 2017  DM Medications: metformin 1000mg BID Patient reports good medication compliance.   HTN: Blood pressure goal <140/<90 Yes  ACE: lisinopril   Hyperlipidemia: Cholesterol goal LDL <100 Yes.   Currently Rx Statin: pravastatin  Diabetic diet: Yes  Exercise: walking 20 or more min per day.   Last monofilament foot exam: 2017 Checks feet at home: Yes, no sores currently   Last Eye exam: 2017   Kidney function: GFR wnl 2017  Microalbumin screenin, elevated on 2017  Has patient received flu vaccine: No  Has patient received Hep B series:No    A1c goal <7 No  Current barriers to control include regaining access to healthcare.   Glucose monitoring frequency: BID  Fasting sugars: 211-260. Post-prandial sugars: 300  Hypoglycemic episodes No  Diabetic complications: none known at this time    The patient is not taking ASA every day and is taking all other medications as prescribed. Patient denies any side effects of medication.           Patient Active Problem List    Diagnosis Date Noted   • Uncontrolled type 2 diabetes mellitus with complication, without long-term current use of insulin (CMS-HCC) 2017   • Gastric ulcer without hemorrhage or perforation 2017       Allergies:Pcn    Current Outpatient Prescriptions   Medication Sig Dispense Refill   • esomeprazole (NEXIUM) 40 MG delayed-release capsule Take 1 Cap by mouth every morning before breakfast. 90 Cap 1   • glipiZIDE (GLUCOTROL) 10 MG Tab Take 1 Tab by mouth 2 times a day. 60 Tab 2   • Blood Glucose Monitoring Suppl Device Meter: Dispense Device of Insurance Preference. Sig. Use as  directed for blood sugar monitoring. #1. NR. 1 Device 0   • Blood Glucose Monitoring Suppl SUPPLIES Misc Insurance preferred test strips 120 Strip 3   • Lancets Misc Lancets order: Lancets for insurance preferred meter. Sig: use QAM fasting and prn ssx high or low sugar. #100 RF x 3 120 Each 2   • lisinopril (PRINIVIL) 10 MG Tab Take 1 Tab by mouth every morning. 30 Tab 5   • metformin (GLUCOPHAGE) 1000 MG tablet Take 1 Tab by mouth 2 times a day, with meals. 60 Tab 5   • pravastatin (PRAVACHOL) 10 MG Tab Take 1 Tab by mouth every bedtime. 30 Tab 11     No current facility-administered medications for this visit.       Social History   Substance Use Topics   • Smoking status: Former Smoker     Types: Cigarettes     Quit date: 2009   • Smokeless tobacco: Never Used   • Alcohol Use: No      Comment: occasional       Family Status   Relation Status Death Age   • Brother Alive    • Mother     • Father     • Maternal Grandmother       Family History   Problem Relation Age of Onset   • Diabetes Father    • Cancer Father    • Hypertension Father    • Diabetes Maternal Grandmother    • Hypertension Maternal Grandmother    • Heart Attack Maternal Grandmother    • Arrythmia Brother    • Heart Attack Brother        Review of Systems:   Constitutional: Negative for fever, chills, weight loss and malaise/fatigue.   HENT: Negative for ear pain, nosebleeds, congestion, sore throat and neck pain.    Eyes: Negative for blurred vision.   Respiratory: Negative for cough, sputum production, shortness of breath and wheezing.    Cardiovascular: Negative for chest pain, palpitations, orthopnea and leg swelling.   Gastrointestinal: Negative for heartburn, nausea, vomiting and abdominal pain.   Genitourinary: Negative for dysuria, urgency and frequency.   Musculoskeletal: Negative for myalgias. Positive for some back pain.   Skin: Negative for rash and itching.   Neurological: Positive for some lightheadedness  "due to normalizing sugar levels. Negative for dizziness, tingling, tremors, sensory change, focal weakness and headaches.   Endo/Heme/Allergies: Does not bruise/bleed easily.   Psychiatric/Behavioral: Negative for depression, suicidal ideas and memory loss.  The patient is not nervous/anxious and does not have insomnia.      Exam:  Blood pressure 90/60, pulse 90, temperature 36.3 °C (97.3 °F), resp. rate 18, height 1.549 m (5' 0.98\"), weight 85.73 kg (189 lb), last menstrual period 07/01/2007, SpO2 91 %.  Body mass index is 35.73 kg/(m^2).  General:  Obese female in NAD  Head: is grossly normal. Caries in nearly every tooth.  Neck: Supple without masses. Thyroid is not visibly enlarged.  Pulmonary: Clear to ausculation. Normal effort. No rales, ronchi, or wheezing.  Cardiovascular: Regular rate and rhythm without murmur. Carotid and radial pulses are intact and equal bilaterally.  Extremities: no clubbing, cyanosis, or edema.    Medical decision-making and discussion:  1. Uncontrolled type 2 diabetes mellitus with complication, without long-term current use of insulin (CMS-[facility])  Patient will be started on glipizide today. She is encouraged to continue with her current diabetic diet and to increase exercise as she is able.  - glipiZIDE (GLUCOTROL) 10 MG Tab; Take 1 Tab by mouth 2 times a day.  Dispense: 60 Tab; Refill: 2  - Diabetic Monofilament Lower Extremity Exam    2. Gastric ulcer without hemorrhage or perforation, unspecified chronicity  Patient states the Prilosec was not approved by her insurance so the following medication will be prescribed in its place.  - esomeprazole (NEXIUM) 40 MG delayed-release capsule; Take 1 Cap by mouth every morning before breakfast.  Dispense: 90 Cap; Refill: 1    Regarding health maintenance: Patient is resistant to doing so however she would like to have vaccines at her next visit.     Please note that this dictation was created using voice recognition software. I have made " every reasonable attempt to correct obvious errors, but I expect that there are errors of grammar and possibly content that I did not discover before finalizing the note.      Return in about 4 weeks (around 9/19/2017) for dm.

## 2017-11-06 ENCOUNTER — OFFICE VISIT (OUTPATIENT)
Dept: MEDICAL GROUP | Facility: CLINIC | Age: 47
End: 2017-11-06
Payer: MEDICAID

## 2017-11-06 VITALS
TEMPERATURE: 98.3 F | WEIGHT: 202 LBS | HEIGHT: 64 IN | OXYGEN SATURATION: 94 % | SYSTOLIC BLOOD PRESSURE: 122 MMHG | HEART RATE: 86 BPM | BODY MASS INDEX: 34.49 KG/M2 | DIASTOLIC BLOOD PRESSURE: 82 MMHG

## 2017-11-06 DIAGNOSIS — Z13.6 SCREENING FOR CARDIOVASCULAR CONDITION: ICD-10-CM

## 2017-11-06 DIAGNOSIS — Z23 NEED FOR VACCINATION: ICD-10-CM

## 2017-11-06 DIAGNOSIS — G89.29 CHRONIC MIDLINE THORACIC BACK PAIN: ICD-10-CM

## 2017-11-06 DIAGNOSIS — M54.6 CHRONIC MIDLINE THORACIC BACK PAIN: ICD-10-CM

## 2017-11-06 DIAGNOSIS — E66.9 OBESITY (BMI 30-39.9): ICD-10-CM

## 2017-11-06 LAB
HBA1C MFR BLD: 7.7 % (ref ?–5.8)
INT CON NEG: NEGATIVE
INT CON POS: POSITIVE

## 2017-11-06 PROCEDURE — 99213 OFFICE O/P EST LOW 20 MIN: CPT | Mod: 25 | Performed by: PHYSICIAN ASSISTANT

## 2017-11-06 PROCEDURE — 90732 PPSV23 VACC 2 YRS+ SUBQ/IM: CPT | Performed by: PHYSICIAN ASSISTANT

## 2017-11-06 PROCEDURE — 90471 IMMUNIZATION ADMIN: CPT | Performed by: PHYSICIAN ASSISTANT

## 2017-11-06 PROCEDURE — 83036 HEMOGLOBIN GLYCOSYLATED A1C: CPT | Performed by: PHYSICIAN ASSISTANT

## 2017-11-06 PROCEDURE — 90746 HEPB VACCINE 3 DOSE ADULT IM: CPT | Performed by: PHYSICIAN ASSISTANT

## 2017-11-06 PROCEDURE — 90472 IMMUNIZATION ADMIN EACH ADD: CPT | Performed by: PHYSICIAN ASSISTANT

## 2017-11-06 PROCEDURE — 90715 TDAP VACCINE 7 YRS/> IM: CPT | Performed by: PHYSICIAN ASSISTANT

## 2017-11-06 RX ORDER — GLIPIZIDE 10 MG/1
10 TABLET ORAL 2 TIMES DAILY
Qty: 60 TAB | Refills: 2 | Status: SHIPPED | OUTPATIENT
Start: 2017-11-06 | End: 2018-05-17

## 2017-11-06 RX ORDER — PIOGLITAZONEHYDROCHLORIDE 15 MG/1
15 TABLET ORAL DAILY
Qty: 30 TAB | Refills: 11 | Status: SHIPPED | OUTPATIENT
Start: 2017-11-06 | End: 2018-05-17 | Stop reason: SDUPTHER

## 2017-11-06 NOTE — ASSESSMENT & PLAN NOTE
Patient has tried massage, heat pack, tylenol, and other OTC pain medications but her back pain has become increasingly worse and she would like another treatment for this. She does not recall having had xrays of her back.

## 2017-11-06 NOTE — PROGRESS NOTES
Chief Complaint   Patient presents with   • Diabetes   • Back Pain       HISTORY OF PRESENT ILLNESS: Patient is a 47 y.o. female established patient who presents today for evaluation and management of:    Uncontrolled type 2 diabetes mellitus with complication, without long-term current use of insulin (CMS-HCC)  Last A1c:  7.7% today 11/6/17  DM Medications: glipizide and metformin Patient reports moderate medication compliance.   HTN: Blood pressure goal <140/<90 Yes  ACE: lisinopril  Hyperlipidemia: Cholesterol goal LDL <100 Yes July 2017 Triglycerides elevated.   Currently Rx Statin: atorvastatin  Diabetic diet: Yes  Exercise: walking daily  Last monofilament foot exam: July, 2017 Checks feet at home: Yes, no sores currently   Last Eye exam: August 2017, abnormal- due for repeat in Feb 2018   Kidney function: wnl July 2017  Microalbumin screening: wnl July 2017  Has patient received flu vaccine: No, patient refused  Has patient received Hep B series:No, will start series today    A1c goal <7   Current barriers to control include finances, low comprehension and education  Glucose monitoring frequency: QAM  Fasting sugars: 190's. Post-prandial sugars: 140's  Hypoglycemic episodes No  Diabetic complications: retinopathy    The patient is not taking ASA every day and is taking all other medications as prescribed. Patient denies any side effects of medication.         Patient Active Problem List    Diagnosis Date Noted   • Chronic midline thoracic back pain 11/06/2017   • Obesity (BMI 30-39.9) 11/06/2017   • Uncontrolled type 2 diabetes mellitus with complication, without long-term current use of insulin (CMS-HCC) 07/25/2017   • Gastric ulcer without hemorrhage or perforation 07/25/2017       Allergies:Pcn [penicillins]    Current Outpatient Prescriptions   Medication Sig Dispense Refill   • glipiZIDE (GLUCOTROL) 10 MG Tab Take 1 Tab by mouth 2 times a day. 60 Tab 2   • pioglitazone (ACTOS) 15 MG Tab Take 1 Tab by  mouth every day. 30 Tab 11   • esomeprazole (NEXIUM) 40 MG delayed-release capsule Take 1 Cap by mouth every morning before breakfast. 90 Cap 1   • lisinopril (PRINIVIL) 10 MG Tab Take 1 Tab by mouth every morning. 30 Tab 5   • metformin (GLUCOPHAGE) 1000 MG tablet Take 1 Tab by mouth 2 times a day, with meals. 60 Tab 5   • pravastatin (PRAVACHOL) 10 MG Tab Take 1 Tab by mouth every bedtime. 30 Tab 11   • Blood Glucose Monitoring Suppl Device Meter: Dispense Device of Insurance Preference. Sig. Use as directed for blood sugar monitoring. #1. NR. 1 Device 0   • Blood Glucose Monitoring Suppl SUPPLIES Misc Insurance preferred test strips 120 Strip 3   • Lancets Misc Lancets order: Lancets for insurance preferred meter. Sig: use QAM fasting and prn ssx high or low sugar. #100 RF x 3 120 Each 2     No current facility-administered medications for this visit.        Social History   Substance Use Topics   • Smoking status: Former Smoker     Types: Cigarettes     Quit date: 2009   • Smokeless tobacco: Never Used   • Alcohol use No      Comment: occasional       Family Status   Relation Status   • Brother Alive   • Mother    • Father    • Maternal Grandmother    • Brother    • Brother      Family History   Problem Relation Age of Onset   • Diabetes Father    • Cancer Father    • Hypertension Father    • Diabetes Maternal Grandmother    • Hypertension Maternal Grandmother    • Heart Attack Maternal Grandmother    • Arrythmia Brother    • Heart Attack Brother        Review of Systems:   Constitutional: Negative for fever, chills, weight loss and malaise/fatigue.   HENT: Negative for ear pain, nosebleeds, congestion, sore throat and neck pain.    Eyes: Negative for blurred vision.   Respiratory: Negative for cough, sputum production, shortness of breath and wheezing.    Cardiovascular: Negative for chest pain, palpitations, orthopnea and leg swelling.   Gastrointestinal: Negative for heartburn,  "nausea, vomiting and abdominal pain.   Genitourinary: Negative for dysuria, urgency and frequency.   Musculoskeletal: Positive for myalgias, back pain and Negative for joint pain.   Skin: Negative for rash and itching.   Neurological: Negative for dizziness, tingling, tremors, sensory change, focal weakness and headaches.   Endo/Heme/Allergies: Does not bruise/bleed easily.   Psychiatric/Behavioral: Negative for depression, suicidal ideas and memory loss.  The patient is not nervous/anxious and does not have insomnia.      Exam:  Blood pressure 122/82, pulse 86, temperature 36.8 °C (98.3 °F), height 1.626 m (5' 4\"), weight 91.6 kg (202 lb), SpO2 94 %.  Body mass index is 34.67 kg/m².  General:  Obese female in NAD  Head: is grossly normal.  Neck: Supple without masses. Thyroid is not visibly enlarged.  Pulmonary:  Normal effort.   Cardiovascular: Radial pulses are intact and equal bilaterally.  Extremities: no clubbing, cyanosis, or edema.    Medical decision-making and discussion:  1. Uncontrolled type 2 diabetes mellitus with complication, without long-term current use of insulin (CMS-HCC)  Continue taking metformin, walking and controlling diet.   - glipiZIDE (GLUCOTROL) 10 MG Tab; Take 1 Tab by mouth 2 times a day.  Dispense: 60 Tab; Refill: 2  - pioglitazone (ACTOS) 15 MG Tab; Take 1 Tab by mouth every day.  Dispense: 30 Tab; Refill: 11  - LIPID PANEL  - TSH+FREE T4  - POCT  A1C      2. Screening for cardiovascular condition  - LIPID PANEL    3. Need for vaccination  - PneumoVax PPV23 =>1yo  - Tdap =>6yo IM  - Hep B Adult 20+  Refuses flu shot due to having become very ill fron a flu shot in the 1990's    4. Chronic midline thoracic back pain    - REFERRAL TO PAIN CLINIC  - DX-THORACOLUMBAR SPINE-2 VIEWS; Future  - DX-THORACIC SPINE-2 VIEWS; Future    5. Obesity (BMI 30-39.9)  - Patient identified as having weight management issue.  Appropriate orders and counseling given.      Please note that this dictation " was created using voice recognition software. I have made every reasonable attempt to correct obvious errors, but I expect that there are errors of grammar and possibly content that I did not discover before finalizing the note.      Return for Female annual ASAP.

## 2017-11-06 NOTE — ASSESSMENT & PLAN NOTE
Last A1c:  7.7% today 11/6/17  DM Medications: glipizide and metformin Patient reports moderate medication compliance.   HTN: Blood pressure goal <140/<90 Yes  ACE: lisinopril  Hyperlipidemia: Cholesterol goal LDL <100 Yes July 2017 Triglycerides elevated.   Currently Rx Statin: atorvastatin  Diabetic diet: Yes  Exercise: walking daily  Last monofilament foot exam: July, 2017 Checks feet at home: Yes, no sores currently   Last Eye exam: August 2017, abnormal- due for repeat in Feb 2018   Kidney function: wnl July 2017  Microalbumin screening: wnl July 2017  Has patient received flu vaccine: No, patient refused  Has patient received Hep B series:No, will start series today    A1c goal <7   Current barriers to control include finances, low comprehension and education  Glucose monitoring frequency: QAM  Fasting sugars: 190's. Post-prandial sugars: 140's  Hypoglycemic episodes No  Diabetic complications: retinopathy    The patient is not taking ASA every day and is taking all other medications as prescribed. Patient denies any side effects of medication.

## 2017-11-14 ENCOUNTER — OFFICE VISIT (OUTPATIENT)
Dept: URGENT CARE | Facility: PHYSICIAN GROUP | Age: 47
End: 2017-11-14
Payer: MEDICAID

## 2017-11-14 VITALS
WEIGHT: 195 LBS | RESPIRATION RATE: 18 BRPM | SYSTOLIC BLOOD PRESSURE: 124 MMHG | HEART RATE: 82 BPM | DIASTOLIC BLOOD PRESSURE: 78 MMHG | OXYGEN SATURATION: 95 % | BODY MASS INDEX: 33.47 KG/M2 | TEMPERATURE: 97.7 F

## 2017-11-14 DIAGNOSIS — M54.6 ACUTE BILATERAL THORACIC BACK PAIN: ICD-10-CM

## 2017-11-14 PROCEDURE — 99214 OFFICE O/P EST MOD 30 MIN: CPT | Mod: 25 | Performed by: FAMILY MEDICINE

## 2017-11-14 RX ORDER — KETOROLAC TROMETHAMINE 30 MG/ML
60 INJECTION, SOLUTION INTRAMUSCULAR; INTRAVENOUS ONCE
Status: COMPLETED | OUTPATIENT
Start: 2017-11-14 | End: 2017-11-14

## 2017-11-14 RX ORDER — CYCLOBENZAPRINE HCL 10 MG
TABLET ORAL
Qty: 30 TAB | Refills: 0 | Status: SHIPPED | OUTPATIENT
Start: 2017-11-14 | End: 2017-12-19

## 2017-11-14 RX ORDER — TRAMADOL HYDROCHLORIDE 50 MG/1
TABLET ORAL
Qty: 30 TAB | Refills: 0 | Status: SHIPPED | OUTPATIENT
Start: 2017-11-14 | End: 2018-05-30

## 2017-11-14 RX ADMIN — KETOROLAC TROMETHAMINE 60 MG: 30 INJECTION, SOLUTION INTRAMUSCULAR; INTRAVENOUS at 15:37

## 2017-11-14 ASSESSMENT — PAIN SCALES - GENERAL: PAINLEVEL: 10=SEVERE PAIN

## 2017-11-14 NOTE — PROGRESS NOTES
Chief Complaint:    Chief Complaint   Patient presents with   • Back Pain     x 2 months       History of Present Illness:    Here with family member. She has severe pain in mid to upper back. She has had pain in this area for at least one year, but it was only mild one year ago. In the past 2 months, the pain has worsened for no particular reason and now pain is severe. Ibuprofen and Tylenol used to help but now is not. Trouble sleeping due to the pain. On 11/6/17, her PCP ordered x-rays of T and L spines (yet to be done) and referral to Pain Clinic - approved for PMR Dr. Martínez. She reports she has a scheduled appointment with a specialist on 11/20/17.       Review of Systems:    Constitutional: Negative for fever, chills, and diaphoresis.   Eyes: Negative for change in vision, photophobia, pain, redness, and discharge.  ENT: Negative for ear pain, ear discharge, hearing loss, tinnitus, nasal congestion, nosebleeds, and sore throat.    Respiratory: Negative for cough, hemoptysis, sputum production, shortness of breath, wheezing, and stridor.    Cardiovascular: Negative for chest pain, palpitations, orthopnea, claudication, leg swelling, and PND.   Gastrointestinal: Negative for abdominal pain, nausea, vomiting, diarrhea, constipation, blood in stool, and melena.   Genitourinary: Negative for dysuria, urinary urgency, urinary frequency, hematuria, and flank pain.   Musculoskeletal: See HPI.  Skin: Negative for rash and itching.   Neurological: Negative for dizziness, tingling, tremors, sensory change, speech change, focal weakness, seizures, loss of consciousness, and headaches.   Endo: Diabetic, on medication.  Heme: Does not bruise/bleed easily.   Psychiatric/Behavioral: Negative for depression, suicidal ideas, hallucinations, memory loss and substance abuse.     Past Medical History:    Past Medical History:   Diagnosis Date   • Gallstone    • Pneumonia 1993, 1994, 1998       Past Surgical History:    Past  Surgical History:   Procedure Laterality Date   • APPENDECTOMY     • CHOLECYSTECTOMY         Social History:    Social History     Social History   • Marital status: Legally      Spouse name: N/A   • Number of children: N/A   • Years of education: N/A     Social History Main Topics   • Smoking status: Former Smoker     Types: Cigarettes     Quit date: 1/1/2009   • Smokeless tobacco: Never Used   • Alcohol use No      Comment: occasional   • Drug use: No   • Sexual activity: Yes     Partners: Male     Other Topics Concern   • Not on file     Social History Narrative   • No narrative on file       Family History:    Family History   Problem Relation Age of Onset   • Diabetes Father    • Cancer Father    • Hypertension Father    • Diabetes Maternal Grandmother    • Hypertension Maternal Grandmother    • Heart Attack Maternal Grandmother    • Arrythmia Brother    • Heart Attack Brother        Medications:    Current Outpatient Prescriptions on File Prior to Visit   Medication Sig Dispense Refill   • glipiZIDE (GLUCOTROL) 10 MG Tab Take 1 Tab by mouth 2 times a day. 60 Tab 2   • pioglitazone (ACTOS) 15 MG Tab Take 1 Tab by mouth every day. 30 Tab 11   • esomeprazole (NEXIUM) 40 MG delayed-release capsule Take 1 Cap by mouth every morning before breakfast. 90 Cap 1   • Blood Glucose Monitoring Suppl Device Meter: Dispense Device of Insurance Preference. Sig. Use as directed for blood sugar monitoring. #1. NR. 1 Device 0   • Blood Glucose Monitoring Suppl SUPPLIES Misc Insurance preferred test strips 120 Strip 3   • Lancets Misc Lancets order: Lancets for insurance preferred meter. Sig: use QAM fasting and prn ssx high or low sugar. #100 RF x 3 120 Each 2   • lisinopril (PRINIVIL) 10 MG Tab Take 1 Tab by mouth every morning. 30 Tab 5   • metformin (GLUCOPHAGE) 1000 MG tablet Take 1 Tab by mouth 2 times a day, with meals. 60 Tab 5   • pravastatin (PRAVACHOL) 10 MG Tab Take 1 Tab by mouth every bedtime. 30 Tab 11      No current facility-administered medications on file prior to visit.        Allergies:    Allergies   Allergen Reactions   • Pcn [Penicillins]          Vitals:    Vitals:    11/14/17 1515   BP: 124/78   Pulse: 82   Resp: 18   Temp: 36.5 °C (97.7 °F)   SpO2: 95%   Weight: 88.5 kg (195 lb)       Physical Exam:    Constitutional: Vital signs reviewed. Appears well-developed and well-nourished. Appears to be in at least moderate pain.  Eyes: Sclera white, conjunctivae clear.  ENT: External ears normal. Hearing normal.  Pulmonary/Chest: Respirations non-labored.  Musculoskeletal: Tender to palpation to light touch in bilateral upper and mid back. Movements involving this area of back reproduce pain.   Neurological: Alert and oriented to person, place, and time. Muscle tone normal. Coordination normal. Light touch and sensation normal.   Skin: No rashes or lesions. Warm, dry, normal turgor.  Psychiatric: Normal mood and affect. Behavior is normal. Judgment and thought content normal.       Assessment / Plan:    1. Acute bilateral thoracic back pain  - cyclobenzaprine (FLEXERIL) 10 MG Tab; 1 TAB EVERY 8 HOURS ONLY IF NEEDED FOR PAIN, MUSCLE SPASM, AND/OR MUSCLE TIGHTNESS. MAY CAUSE DROWSINESS.  Dispense: 30 Tab; Refill: 0  - tramadol (ULTRAM) 50 MG Tab; 1 TAB EVERY 6 HOURS ONLY IF NEEDED FOR PAIN.  Dispense: 30 Tab; Refill: 0  - ketorolac (TORADOL) injection 60 mg; 2 mL by Intramuscular route Once.      Discussed with them DDX and management options.    Will avoid steroids due to DM.    Agreeable to medications given and prescribed.  report checked - last Rx was Oxycodone-APAP 5-325 mg #20 on 8/31/17 by dentist.    Reports she has an appointment with specialist on 11/20/17.    Follow-up with PCP or urgent care if getting worse while waiting to see specialist.

## 2017-12-10 ENCOUNTER — OFFICE VISIT (OUTPATIENT)
Dept: URGENT CARE | Facility: PHYSICIAN GROUP | Age: 47
End: 2017-12-10
Payer: MEDICAID

## 2017-12-10 VITALS
WEIGHT: 200.8 LBS | BODY MASS INDEX: 34.28 KG/M2 | TEMPERATURE: 98.3 F | HEIGHT: 64 IN | HEART RATE: 84 BPM | OXYGEN SATURATION: 97 % | SYSTOLIC BLOOD PRESSURE: 122 MMHG | DIASTOLIC BLOOD PRESSURE: 80 MMHG | RESPIRATION RATE: 16 BRPM

## 2017-12-10 DIAGNOSIS — S29.019D THORACIC MYOFASCIAL STRAIN, SUBSEQUENT ENCOUNTER: ICD-10-CM

## 2017-12-10 PROCEDURE — 99214 OFFICE O/P EST MOD 30 MIN: CPT | Performed by: PHYSICIAN ASSISTANT

## 2017-12-10 RX ORDER — CYCLOBENZAPRINE HCL 10 MG
10 TABLET ORAL 3 TIMES DAILY PRN
Qty: 30 TAB | Refills: 0 | Status: SHIPPED | OUTPATIENT
Start: 2017-12-10 | End: 2017-12-11 | Stop reason: SDUPTHER

## 2017-12-10 RX ORDER — IBUPROFEN 800 MG/1
800 TABLET ORAL EVERY 8 HOURS PRN
Qty: 30 TAB | Refills: 0 | Status: SHIPPED | OUTPATIENT
Start: 2017-12-10

## 2017-12-10 RX ORDER — TRAMADOL HYDROCHLORIDE 50 MG/1
50 TABLET ORAL EVERY 6 HOURS PRN
Qty: 30 TAB | Refills: 0 | Status: SHIPPED | OUTPATIENT
Start: 2017-12-10

## 2017-12-10 ASSESSMENT — PAIN SCALES - GENERAL: PAINLEVEL: 10=SEVERE PAIN
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
## 2017-12-11 DIAGNOSIS — S29.019D THORACIC MYOFASCIAL STRAIN, SUBSEQUENT ENCOUNTER: ICD-10-CM

## 2017-12-19 RX ORDER — CYCLOBENZAPRINE HCL 10 MG
10 TABLET ORAL 3 TIMES DAILY PRN
Qty: 15 TAB | Refills: 0 | Status: SHIPPED | OUTPATIENT
Start: 2017-12-19 | End: 2018-05-30

## 2018-01-29 RX ORDER — LISINOPRIL 10 MG/1
10 TABLET ORAL EVERY MORNING
Qty: 30 TAB | Refills: 0 | Status: SHIPPED | OUTPATIENT
Start: 2018-01-29 | End: 2018-03-13 | Stop reason: SDUPTHER

## 2018-03-13 RX ORDER — LISINOPRIL 10 MG/1
10 TABLET ORAL EVERY MORNING
Qty: 30 TAB | Refills: 0 | Status: SHIPPED | OUTPATIENT
Start: 2018-03-13 | End: 2018-03-15 | Stop reason: SDUPTHER

## 2018-03-13 NOTE — TELEPHONE ENCOUNTER
Informed patient of medication refill. Patient was inquiring if she could get a script written for a nebulizer, she stated that she had one in [place] before she moved.

## 2018-03-15 NOTE — TELEPHONE ENCOUNTER
Insurance will only approve 90 days    Was the patient seen in the last year in this department? Yes     Does patient have an active prescription for medications requested? No     Received Request Via: Pharmacy

## 2018-03-16 RX ORDER — LISINOPRIL 10 MG/1
10 TABLET ORAL EVERY MORNING
Qty: 30 TAB | Refills: 0 | Status: SHIPPED | OUTPATIENT
Start: 2018-03-16 | End: 2018-05-30 | Stop reason: SDUPTHER

## 2018-03-17 ENCOUNTER — OFFICE VISIT (OUTPATIENT)
Dept: MEDICAL GROUP | Facility: CLINIC | Age: 48
End: 2018-03-17
Payer: MEDICAID

## 2018-03-17 VITALS
RESPIRATION RATE: 16 BRPM | TEMPERATURE: 98 F | OXYGEN SATURATION: 94 % | SYSTOLIC BLOOD PRESSURE: 132 MMHG | BODY MASS INDEX: 36.88 KG/M2 | HEART RATE: 86 BPM | DIASTOLIC BLOOD PRESSURE: 84 MMHG | WEIGHT: 216 LBS | HEIGHT: 64 IN

## 2018-03-17 DIAGNOSIS — H65.01 RIGHT ACUTE SEROUS OTITIS MEDIA, RECURRENCE NOT SPECIFIED: ICD-10-CM

## 2018-03-17 DIAGNOSIS — J45.901 ACUTE EXACERBATION OF EXTRINSIC ASTHMA: ICD-10-CM

## 2018-03-17 DIAGNOSIS — R05.9 COUGH: ICD-10-CM

## 2018-03-17 PROCEDURE — 99214 OFFICE O/P EST MOD 30 MIN: CPT | Mod: 25 | Performed by: PHYSICIAN ASSISTANT

## 2018-03-17 PROCEDURE — 94640 AIRWAY INHALATION TREATMENT: CPT | Performed by: PHYSICIAN ASSISTANT

## 2018-03-17 RX ORDER — ALBUTEROL SULFATE 90 UG/1
2 AEROSOL, METERED RESPIRATORY (INHALATION) EVERY 6 HOURS PRN
Qty: 8.5 G | Refills: 0 | Status: SHIPPED | OUTPATIENT
Start: 2018-03-17

## 2018-03-17 RX ORDER — IPRATROPIUM BROMIDE AND ALBUTEROL SULFATE 2.5; .5 MG/3ML; MG/3ML
3 SOLUTION RESPIRATORY (INHALATION) ONCE
Status: COMPLETED | OUTPATIENT
Start: 2018-03-17 | End: 2018-03-17

## 2018-03-17 RX ORDER — IPRATROPIUM BROMIDE AND ALBUTEROL SULFATE 2.5; .5 MG/3ML; MG/3ML
3 SOLUTION RESPIRATORY (INHALATION) 4 TIMES DAILY
Qty: 360 ML | Refills: 0 | Status: SHIPPED | OUTPATIENT
Start: 2018-03-17 | End: 2018-04-16

## 2018-03-17 RX ORDER — AZITHROMYCIN 250 MG/1
TABLET, FILM COATED ORAL
Qty: 6 TAB | Refills: 0 | Status: SHIPPED | OUTPATIENT
Start: 2018-03-17 | End: 2018-05-30

## 2018-03-17 RX ORDER — BENZONATATE 100 MG/1
100 CAPSULE ORAL 3 TIMES DAILY PRN
Qty: 60 CAP | Refills: 0 | Status: SHIPPED | OUTPATIENT
Start: 2018-03-17

## 2018-03-17 RX ORDER — TRIAMCINOLONE ACETONIDE 40 MG/ML
40 INJECTION, SUSPENSION INTRA-ARTICULAR; INTRAMUSCULAR ONCE
Status: COMPLETED | OUTPATIENT
Start: 2018-03-17 | End: 2018-03-17

## 2018-03-17 RX ADMIN — IPRATROPIUM BROMIDE AND ALBUTEROL SULFATE 3 ML: 2.5; .5 SOLUTION RESPIRATORY (INHALATION) at 12:56

## 2018-03-17 RX ADMIN — TRIAMCINOLONE ACETONIDE 40 MG: 40 INJECTION, SUSPENSION INTRA-ARTICULAR; INTRAMUSCULAR at 12:57

## 2018-03-17 ASSESSMENT — ENCOUNTER SYMPTOMS
VOMITING: 0
HEADACHES: 0
FEVER: 0
DOUBLE VISION: 0
ABDOMINAL PAIN: 0
RHINORRHEA: 0
SHORTNESS OF BREATH: 1
DIARRHEA: 0
SORE THROAT: 0
SPUTUM PRODUCTION: 1
MYALGIAS: 0
NAUSEA: 0
DYSPNEA ON EXERTION: 1
WHEEZING: 1
BLURRED VISION: 0
APPETITE CHANGE: 0
CHEST TIGHTNESS: 1
BACK PAIN: 0
SINUS PAIN: 0
COUGH: 1
FREQUENT THROAT CLEARING: 0
HEMOPTYSIS: 0
TROUBLE SWALLOWING: 0
HOARSE VOICE: 0

## 2018-03-17 NOTE — PATIENT INSTRUCTIONS
Asthma, Adult  Asthma is a condition of the lungs in which the airways tighten and narrow. Asthma can make it hard to breathe. Asthma cannot be cured, but medicine and lifestyle changes can help control it. Asthma may be started (triggered) by:  · Animal skin flakes (dander).  · Dust.  · Cockroaches.  · Pollen.  · Mold.  · Smoke.  · Cleaning products.  · Hair sprays or aerosol sprays.  · Paint fumes or strong smells.  · Cold air, weather changes, and winds.  · Crying or laughing hard.  · Stress.  · Certain medicines or drugs.  · Foods, such as dried fruit, potato chips, and sparkling grape juice.  · Infections or conditions (colds, flu).  · Exercise.  · Certain medical conditions or diseases.  · Exercise or tiring activities.  Follow these instructions at home:  · Take medicine as told by your doctor.  · Use a peak flow meter as told by your doctor. A peak flow meter is a tool that measures how well the lungs are working.  · Record and keep track of the peak flow meter's readings.  · Understand and use the asthma action plan. An asthma action plan is a written plan for taking care of your asthma and treating your attacks.  · To help prevent asthma attacks:  ¨ Do not smoke. Stay away from secondhand smoke.  ¨ Change your heating and air conditioning filter often.  ¨ Limit your use of fireplaces and wood stoves.  ¨ Get rid of pests (such as roaches and mice) and their droppings.  ¨ Throw away plants if you see mold on them.  ¨ Clean your floors. Dust regularly. Use cleaning products that do not smell.  ¨ Have someone vacuum when you are not home. Use a vacuum  with a HEPA filter if possible.  ¨ Replace carpet with wood, tile, or vinyl go. Carpet can trap animal skin flakes and dust.  ¨ Use allergy-proof pillows, mattress covers, and box spring covers.  ¨ Wash bed sheets and blankets every week in hot water and dry them in a dryer.  ¨ Use blankets that are made of polyester or cotton.  ¨ Clean bathrooms  and gustavo with bleach. If possible, have someone repaint the walls in these rooms with mold-resistant paint. Keep out of the rooms that are being cleaned and painted.  ¨ Wash hands often.  Contact a doctor if:  · You have make a whistling sound when breaking (wheeze), have shortness of breath, or have a cough even if taking medicine to prevent attacks.  · The colored mucus you cough up (sputum) is thicker than usual.  · The colored mucus you cough up changes from clear or white to yellow, green, gray, or bloody.  · You have problems from the medicine you are taking such as:  ¨ A rash.  ¨ Itching.  ¨ Swelling.  ¨ Trouble breathing.  · You need reliever medicines more than 2-3 times a week.  · Your peak flow measurement is still at 50-79% of your personal best after following the action plan for 1 hour.  · You have a fever.  Get help right away if:  · You seem to be worse and are not responding to medicine during an asthma attack.  · You are short of breath even at rest.  · You get short of breath when doing very little activity.  · You have trouble eating, drinking, or talking.  · You have chest pain.  · You have a fast heartbeat.  · Your lips or fingernails start to turn blue.  · You are light-headed, dizzy, or faint.  · Your peak flow is less than 50% of your personal best.  This information is not intended to replace advice given to you by your health care provider. Make sure you discuss any questions you have with your health care provider.  Document Released: 06/05/2009 Document Revised: 05/25/2017 Document Reviewed: 07/17/2014  [employer] Patient Education © 2017 [employer]

## 2018-03-17 NOTE — PROGRESS NOTES
Subjective:   Namita Tsai is a 47 y.o. female who presents for Asthma and Cough        Asthma   She complains of chest tightness, cough, shortness of breath, sputum production and wheezing. There is no frequent throat clearing, hemoptysis or hoarse voice. This is a recurrent problem. The current episode started in the past 7 days. The problem occurs constantly. The problem has been waxing and waning. The cough is productive of sputum. Associated symptoms include dyspnea on exertion, ear congestion and ear pain. Pertinent negatives include no appetite change, chest pain, fever, headaches, malaise/fatigue, myalgias, nasal congestion, rhinorrhea, sneezing, sore throat or trouble swallowing. Her symptoms are aggravated by any activity. Her symptoms are alleviated by nothing. Risk factors for lung disease include smoking/tobacco exposure (+ second hand smoke exposure). Her past medical history is significant for asthma.   : pt with history Asthma treated with Albuterol in neb or MDI. Pt reports that she moved in March 2017 and lost her nebulizer. She began to have cough and chest congestion with difficulty breathing and wheezing 1 week ago. She has been using her MDI but has run out as of last night. Felt warm. Cough productive of clear sputum. Denies chest pain.     Pt is diabetic. Reports FSBS in the 150 range. Taking DM meds as instructed. Last A1C in Novemeber 7.7.     Review of Systems   Constitutional: Negative for appetite change, fever and malaise/fatigue.   HENT: Positive for ear pain. Negative for congestion, hoarse voice, rhinorrhea, sinus pain, sneezing, sore throat and trouble swallowing.         C/o fullness and slight pain of the right ear   Eyes: Negative for blurred vision and double vision.   Respiratory: Positive for cough, sputum production, shortness of breath and wheezing. Negative for hemoptysis.    Cardiovascular: Positive for dyspnea on exertion. Negative for chest pain.   Gastrointestinal:  "Negative for abdominal pain, diarrhea, nausea and vomiting.   Musculoskeletal: Negative for back pain and myalgias.   Neurological: Negative for headaches.   All other systems reviewed and are negative.    Allergies   Allergen Reactions   • Pcn [Penicillins]       Objective:   /84   Pulse 86   Temp 36.7 °C (98 °F)   Resp 16   Ht 1.626 m (5' 4\")   Wt 98 kg (216 lb)   SpO2 94%   BMI 37.08 kg/m²   Physical Exam   Constitutional: She is oriented to person, place, and time. She appears well-developed and well-nourished. No distress.   HENT:   Head: Normocephalic and atraumatic.   Right Ear: External ear normal.   Left Ear: External ear normal.   Nose: Nose normal.   Mouth/Throat: Oropharynx is clear and moist. No oropharyngeal exudate.   Eyes: Conjunctivae and EOM are normal. Pupils are equal, round, and reactive to light.   Neck: Normal range of motion. Neck supple.   Cardiovascular: Normal rate, regular rhythm and normal heart sounds.  Exam reveals no gallop and no friction rub.    No murmur heard.  Pulmonary/Chest: No respiratory distress. She has wheezes. She has no rales. She exhibits no tenderness.   Frequent tight cough  Tight with poor air exchange with inspiratory and expiratory wheezes throughout.   After breathing treatment: much improved air exchange. No cough. Persistent expiratory wheezes. No rales or rhonchi.    Abdominal: Soft. Bowel sounds are normal. There is no tenderness.   Musculoskeletal: Normal range of motion.   Lymphadenopathy:     She has no cervical adenopathy.        Right: No supraclavicular adenopathy present.        Left: No supraclavicular adenopathy present.   Neurological: She is alert and oriented to person, place, and time.   Skin: Skin is warm and dry. No rash noted.   Psychiatric: She has a normal mood and affect. Judgment normal.         Assessment/Plan:   Assessment    1. Acute exacerbation of extrinsic asthma  Pt is given breathing treatment with Duoneb in clinic with " significant improvement. Pt will need to be provided new RX for Nebulizer for home use. She is  Unable to make it to a pharmacy today or tomorrow due to not having transportation or funds to travel. She is given Kenalog 40 mg IM in clinic (solumedrol not available). Reviewed with patient that this will likely increase her FSBS and she should monitor her sugars carefully and f/u with her PCP if worsens.   RX for Duoneb for nebulizer and Alburerol MDI to use when no nebulizer available.     2. Right acute serous otitis media, recurrence not specified    Zithromax 500/250 5 day RX provided.     3. Uncontrolled type 2 diabetes mellitus with complication, without long-term current use of insulin (CMS-[facility])  Monitor FSBS carefully. Keep f/u with PCP as scheduled.    4. Cough  Tessalon Perles 100 mg q 8 hours prn.     Differential diagnosis, natural history, supportive care, and indications for immediate follow-up discussed.  Strict ER precautions given.

## 2018-03-21 ENCOUNTER — TELEPHONE (OUTPATIENT)
Dept: MEDICAL GROUP | Facility: CLINIC | Age: 48
End: 2018-03-21

## 2018-03-21 NOTE — TELEPHONE ENCOUNTER
PAtient states she will try to get these labs drawn -  She does not have gas money, so he may be unable to find a ride before then. CBC, CMP and A1C were not added to orders - I have pended these for you to sign. You may have to switch the A1C to blood draw, it would only let me choose POCT, unless you want to do that one in office POCT.

## 2018-03-21 NOTE — TELEPHONE ENCOUNTER
MEDICATION PRIOR AUTHORIZATION NEEDED:    1. Name of Medication: Lisinopril    2. Requested By (Name of Pharmacy): [facility]     3. Is insurance on file current? yes    4. What is the name & phone number of the 3rd party payor? N/a    Prior auth had been submitted and pending approval

## 2018-05-07 ENCOUNTER — HOSPITAL ENCOUNTER (OUTPATIENT)
Facility: MEDICAL CENTER | Age: 48
End: 2018-05-07
Attending: PHYSICIAN ASSISTANT
Payer: MEDICAID

## 2018-05-07 ENCOUNTER — NON-PROVIDER VISIT (OUTPATIENT)
Dept: MEDICAL GROUP | Facility: CLINIC | Age: 48
End: 2018-05-07
Payer: MEDICAID

## 2018-05-07 DIAGNOSIS — Z01.89 ROUTINE LAB DRAW: ICD-10-CM

## 2018-05-07 LAB
ALBUMIN SERPL BCP-MCNC: 3.8 G/DL (ref 3.2–4.9)
ALBUMIN/GLOB SERPL: 1 G/DL
ALP SERPL-CCNC: 97 U/L (ref 30–99)
ALT SERPL-CCNC: 18 U/L (ref 2–50)
ANION GAP SERPL CALC-SCNC: 9 MMOL/L (ref 0–11.9)
AST SERPL-CCNC: 15 U/L (ref 12–45)
BILIRUB SERPL-MCNC: 0.5 MG/DL (ref 0.1–1.5)
BUN SERPL-MCNC: 15 MG/DL (ref 8–22)
CALCIUM SERPL-MCNC: 9.9 MG/DL (ref 8.5–10.5)
CHLORIDE SERPL-SCNC: 99 MMOL/L (ref 96–112)
CHOLEST SERPL-MCNC: 189 MG/DL (ref 100–199)
CO2 SERPL-SCNC: 27 MMOL/L (ref 20–33)
CREAT SERPL-MCNC: 0.69 MG/DL (ref 0.5–1.4)
ERYTHROCYTE [DISTWIDTH] IN BLOOD BY AUTOMATED COUNT: 44.6 FL (ref 35.9–50)
GLOBULIN SER CALC-MCNC: 3.7 G/DL (ref 1.9–3.5)
GLUCOSE SERPL-MCNC: 260 MG/DL (ref 65–99)
HCT VFR BLD AUTO: 44.9 % (ref 37–47)
HDLC SERPL-MCNC: 58 MG/DL
HGB BLD-MCNC: 14.4 G/DL (ref 12–16)
LDLC SERPL CALC-MCNC: 89 MG/DL
MCH RBC QN AUTO: 30.8 PG (ref 27–33)
MCHC RBC AUTO-ENTMCNC: 32.1 G/DL (ref 33.6–35)
MCV RBC AUTO: 95.9 FL (ref 81.4–97.8)
PLATELET # BLD AUTO: 332 K/UL (ref 164–446)
PMV BLD AUTO: 10.4 FL (ref 9–12.9)
POTASSIUM SERPL-SCNC: 4.3 MMOL/L (ref 3.6–5.5)
PROT SERPL-MCNC: 7.5 G/DL (ref 6–8.2)
RBC # BLD AUTO: 4.68 M/UL (ref 4.2–5.4)
SODIUM SERPL-SCNC: 135 MMOL/L (ref 135–145)
T4 FREE SERPL-MCNC: 0.93 NG/DL (ref 0.53–1.43)
TRIGL SERPL-MCNC: 211 MG/DL (ref 0–149)
TSH SERPL DL<=0.005 MIU/L-ACNC: 6.88 UIU/ML (ref 0.38–5.33)
WBC # BLD AUTO: 11 K/UL (ref 4.8–10.8)

## 2018-05-07 PROCEDURE — 85027 COMPLETE CBC AUTOMATED: CPT

## 2018-05-07 PROCEDURE — 80061 LIPID PANEL: CPT

## 2018-05-07 PROCEDURE — 36415 COLL VENOUS BLD VENIPUNCTURE: CPT | Performed by: PHYSICIAN ASSISTANT

## 2018-05-07 PROCEDURE — 80053 COMPREHEN METABOLIC PANEL: CPT

## 2018-05-07 PROCEDURE — 83036 HEMOGLOBIN GLYCOSYLATED A1C: CPT

## 2018-05-07 PROCEDURE — 84439 ASSAY OF FREE THYROXINE: CPT

## 2018-05-07 PROCEDURE — 99000 SPECIMEN HANDLING OFFICE-LAB: CPT | Performed by: PHYSICIAN ASSISTANT

## 2018-05-07 PROCEDURE — 84443 ASSAY THYROID STIM HORMONE: CPT

## 2018-05-09 LAB
EST. AVERAGE GLUCOSE BLD GHB EST-MCNC: 223 MG/DL
HBA1C MFR BLD: 9.4 % (ref 0–5.6)

## 2018-05-17 ENCOUNTER — OFFICE VISIT (OUTPATIENT)
Dept: MEDICAL GROUP | Facility: CLINIC | Age: 48
End: 2018-05-17
Payer: MEDICAID

## 2018-05-17 VITALS
BODY MASS INDEX: 37.05 KG/M2 | WEIGHT: 217 LBS | DIASTOLIC BLOOD PRESSURE: 82 MMHG | SYSTOLIC BLOOD PRESSURE: 126 MMHG | HEART RATE: 78 BPM | RESPIRATION RATE: 16 BRPM | HEIGHT: 64 IN | TEMPERATURE: 97.7 F | OXYGEN SATURATION: 98 %

## 2018-05-17 DIAGNOSIS — E03.9 HYPOTHYROIDISM, UNSPECIFIED TYPE: ICD-10-CM

## 2018-05-17 DIAGNOSIS — G89.29 CHRONIC MIDLINE THORACIC BACK PAIN: ICD-10-CM

## 2018-05-17 DIAGNOSIS — E66.9 OBESITY (BMI 30-39.9): ICD-10-CM

## 2018-05-17 DIAGNOSIS — M54.6 CHRONIC MIDLINE THORACIC BACK PAIN: ICD-10-CM

## 2018-05-17 PROCEDURE — 99214 OFFICE O/P EST MOD 30 MIN: CPT | Performed by: PHYSICIAN ASSISTANT

## 2018-05-17 RX ORDER — PIOGLITAZONEHYDROCHLORIDE 30 MG/1
30 TABLET ORAL DAILY
Qty: 90 TAB | Refills: 1 | Status: SHIPPED | OUTPATIENT
Start: 2018-05-17

## 2018-05-17 RX ORDER — LEVOTHYROXINE SODIUM 0.03 MG/1
25 TABLET ORAL
Qty: 30 TAB | Refills: 0 | Status: SHIPPED | OUTPATIENT
Start: 2018-05-17 | End: 2018-05-30 | Stop reason: SDUPTHER

## 2018-05-17 NOTE — ASSESSMENT & PLAN NOTE
This patient states she walks for approximately one hour each day and claims to only consume fruits, chicken, fish and vegetables.

## 2018-05-17 NOTE — PROGRESS NOTES
Chief Complaint   Patient presents with   • Back Pain     chronic back pain      HISTORY OF PRESENT ILLNESS: Patient is a 47 y.o. female established patient who presents today for evaluation and management of:    Uncontrolled type 2 diabetes mellitus with complication, without long-term current use of insulin (CMS-[facility])  Last A1c: 9.4% 5/7/18 up from 7.7% Nov 2017   DM Medications: metformin 1000mg BID, pioglitazone 15 mg daily and glipizide daily Patient reports good medication compliance.   HTN: Blood pressure goal <140/<90 Yes  ACE: lisinopril 10mg daily  Hyperlipidemia: Cholesterol goal LDL <100 Yes.   Currently Rx Statin: pravastatin 10mg   Diabetic diet: Yes  Exercise: walking about 1 hour per day  Last monofilament foot exam: august 2018 Checks feet at home: Yes, no sores currently   Last Eye exam: abnormal, patient has not repeated test as was requested in February 2018   Kidney function: wnl   Microalbumin screening: wnl  Has patient received flu vaccine: No  Has patient received Hep B series:No    A1c goal <7 No  Current barriers to control include diet, and medication compliance.   Glucose monitoring frequency: TID  Fasting sugars: 190's. Post-prandial sugars: 250's  Hypoglycemic episodes No  Diabetic complications: retinopathy     The patient is not taking ASA every day and is not taking all other medications as prescribed. Patient denies any side effects of medication.      Hypothyroidism  Component      Latest Ref Rng & Units 5/7/2018   TSH      0.380 - 5.330 uIU/mL 6.880 (H)   Free T-4      0.53 - 1.43 ng/dL 0.93   Although this patient's free T4 is within normal limits today, her diabetes has recently declined and she continues to maintain a BMI of 37.25 despite what she claims as a healthy diet. Her mother does take thyroid medications. She states she has severe daily lethargy.     Obesity (BMI 30-39.9)  This patient states she walks for approximately one hour each day and claims to only consume  fruits, chicken, fish and vegetables.    Chronic midline thoracic back pain  This patient again presents with chronic midline thoracic back pain. She states she has tried massage, heat packs and over-the-counter medication in order to alleviate this back pain. She has recently been given tramadol twice in the past 6 months in order to alleviate this pain which she states only somewhat worked for her. She was referred for physiatry consult and had an appointment for this in November 2017 but she did not appear for this appointment. She is requesting pain medications today.       Patient Active Problem List    Diagnosis Date Noted   • Hypothyroidism 05/17/2018   • Chronic midline thoracic back pain 11/06/2017   • Obesity (BMI 30-39.9) 11/06/2017   • Uncontrolled type 2 diabetes mellitus with complication, without long-term current use of insulin ([facility]) 07/25/2017   • Gastric ulcer without hemorrhage or perforation 07/25/2017       Allergies:Pcn [penicillins]    Current Outpatient Prescriptions   Medication Sig Dispense Refill   • pioglitazone (ACTOS) 30 MG Tab Take 1 Tab by mouth every day. 90 Tab 1   • Empagliflozin 10 MG Tab Take 1 Tab by mouth every morning. 14 Tab 0   • Empagliflozin 25 MG Tab Take 1 Tab by mouth every morning with breakfast. After taking 10mg PO for 2 weeks. 30 Tab 2   • levothyroxine (SYNTHROID) 25 MCG Tab Take 1 Tab by mouth Every morning on an empty stomach. 30 Tab 0   • albuterol 108 (90 Base) MCG/ACT Aero Soln inhalation aerosol Inhale 2 Puffs by mouth every 6 hours as needed for Shortness of Breath. 8.5 g 0   • azithromycin (ZITHROMAX) 250 MG Tab Take 2 tabs day 1, take 1 tab days 2-5 6 Tab 0   • benzonatate (TESSALON) 100 MG Cap Take 1 Cap by mouth 3 times a day as needed for Cough. 60 Cap 0   • lisinopril (PRINIVIL) 10 MG Tab Take 1 Tab by mouth every morning. 30 Tab 0   • cyclobenzaprine (FLEXERIL) 10 MG Tab Take 1 Tab by mouth 3 times a day as needed for Moderate Pain. 15 Tab 0   •  tramadol (ULTRAM) 50 MG Tab Take 1 Tab by mouth every 6 hours as needed for Moderate Pain. 30 Tab 0   • ibuprofen (MOTRIN) 800 MG Tab Take 1 Tab by mouth every 8 hours as needed for Moderate Pain. 30 Tab 0   • tramadol (ULTRAM) 50 MG Tab 1 TAB EVERY 6 HOURS ONLY IF NEEDED FOR PAIN. 30 Tab 0   • esomeprazole (NEXIUM) 40 MG delayed-release capsule Take 1 Cap by mouth every morning before breakfast. 90 Cap 1   • Blood Glucose Monitoring Suppl Device Meter: Dispense Device of Insurance Preference. Sig. Use as directed for blood sugar monitoring. #1. NR. 1 Device 0   • Blood Glucose Monitoring Suppl SUPPLIES Misc Insurance preferred test strips 120 Strip 3   • Lancets Misc Lancets order: Lancets for insurance preferred meter. Sig: use QAM fasting and prn ssx high or low sugar. #100 RF x 3 120 Each 2   • metformin (GLUCOPHAGE) 1000 MG tablet Take 1 Tab by mouth 2 times a day, with meals. 60 Tab 5   • pravastatin (PRAVACHOL) 10 MG Tab Take 1 Tab by mouth every bedtime. 30 Tab 11     No current facility-administered medications for this visit.        Social History   Substance Use Topics   • Smoking status: Former Smoker     Types: Cigarettes     Quit date: 2009   • Smokeless tobacco: Never Used   • Alcohol use No      Comment: occasional       Family Status   Relation Status   • Brother Alive   • Mother    • Father    • Maternal Grandmother    • Brother    • Brother      Family History   Problem Relation Age of Onset   • Diabetes Father    • Cancer Father    • Hypertension Father    • Diabetes Maternal Grandmother    • Hypertension Maternal Grandmother    • Heart Attack Maternal Grandmother    • Arrythmia Brother    • Heart Attack Brother      Review of Systems:   Constitutional: Negative for fever, chills, weight loss and malaise  HENT: Negative for ear pain, nosebleeds, congestion, sore throat and neck pain.    Eyes: Negative for blurred vision.   Respiratory: Negative for cough, sputum  "production, shortness of breath and wheezing.    Cardiovascular: Negative for chest pain  Gastrointestinal: Negative for heartburn  Genitourinary: Negative for dysuria, urgency and frequency.   Musculoskeletal: Negative for myalgias and joint pain. See HPI above.   Skin: Negative for rash and itching.   Neurological: Negative for dizziness and headaches.   Endo/Heme/Allergies: Does not bruise/bleed easily.   Psychiatric/Behavioral: Negative for depression, suicidal ideas and memory loss.  The patient is not nervous/anxious and does not have insomnia.      Exam:  Blood pressure 126/82, pulse 78, temperature 36.5 °C (97.7 °F), resp. rate 16, height 1.626 m (5' 4\"), weight 98.4 kg (217 lb), SpO2 98 %.  Body mass index is 37.25 kg/m².  General:  Obese female in NAD  Head: is grossly normal.  Neck: Supple without masses. Thyroid is not visibly enlarged.  Pulmonary: Clear to ausculation. Normal effort. No rales, ronchi, or wheezing.  Cardiovascular: Regular rate and rhythm without murmur. Carotid and radial pulses are intact and equal bilaterally.  Extremities: no clubbing, cyanosis, or edema.  Behavioral: This patient seems very inattentive, has difficulty recalling short term and long term events, and seems to fall asleep in the middle of asking questions during her history taking.       Medical decision-making and discussion:  1. Uncontrolled type 2 diabetes mellitus with complication, without long-term current use of insulin (HCC)  Following is a medication change for this patient. She has been advised to stop using glipizide and instead increase her pioglitazone from 15 mg to 30 mg every morning. She was also advised to start using dirty and as prescribed below. Adverse effects were discussed with this patient.   - pioglitazone (ACTOS) 30 MG Tab; Take 1 Tab by mouth every day.  Dispense: 90 Tab; Refill: 1  - Empagliflozin 10 MG Tab; Take 1 Tab by mouth every morning.  Dispense: 14 Tab; Refill: 0  - Empagliflozin 25 " MG Tab; Take 1 Tab by mouth every morning with breakfast. After taking 10mg PO for 2 weeks.  Dispense: 30 Tab; Refill: 2    2. Hypothyroidism, unspecified type  The following is a new medication for this patient with this new diagnosis. The instructions for this prescription were described to the patient and she confirms understanding.  - levothyroxine (SYNTHROID) 25 MCG Tab; Take 1 Tab by mouth Every morning on an empty stomach.  Dispense: 30 Tab; Refill: 0    3. Obesity (BMI 30-39.9)  - Patient identified as having weight management issue.  Appropriate orders and counseling given.    4. Chronic midline thoracic back pain  This patient was advised to continue using conservative measures including heat packs, stretching, massage and over-the-counter medications as needed and to follow through with the referral that had been placed in November 2017 with Dr. Martínez.      Please note that this dictation was created using voice recognition software. I have made every reasonable attempt to correct obvious errors, but I expect that there are errors of grammar and possibly content that I did not discover before finalizing the note.      Return in about 2 weeks (around 5/31/2018) for DM, patient needs to bring in blood sugar log.

## 2018-05-17 NOTE — ASSESSMENT & PLAN NOTE
Component      Latest Ref Rng & Units 5/7/2018   TSH      0.380 - 5.330 uIU/mL 6.880 (H)   Free T-4      0.53 - 1.43 ng/dL 0.93   Although this patient's free T4 is within normal limits today, her diabetes has recently declined and she continues to maintain a BMI of 37.25 despite what she claims as a healthy diet. Her mother does take thyroid medications. She states she has severe daily lethargy.

## 2018-05-17 NOTE — ASSESSMENT & PLAN NOTE
Last A1c: 9.4% 5/7/18 up from 7.7% Nov 2017   DM Medications: metformin 1000mg BID, pioglitazone 15 mg daily and glipizide daily Patient reports good medication compliance.   HTN: Blood pressure goal <140/<90 Yes  ACE: lisinopril 10mg daily  Hyperlipidemia: Cholesterol goal LDL <100 Yes.   Currently Rx Statin: pravastatin 10mg   Diabetic diet: Yes  Exercise: walking about 1 hour per day  Last monofilament foot exam: august 2018 Checks feet at home: Yes, no sores currently   Last Eye exam: abnormal, patient has not repeated test as was requested in February 2018   Kidney function: wnl   Microalbumin screening: wnl  Has patient received flu vaccine: No  Has patient received Hep B series:No    A1c goal <7 No  Current barriers to control include diet, and medication compliance.   Glucose monitoring frequency: TID  Fasting sugars: 190's. Post-prandial sugars: 250's  Hypoglycemic episodes No  Diabetic complications: retinopathy     The patient is not taking ASA every day and is not taking all other medications as prescribed. Patient denies any side effects of medication.

## 2018-05-17 NOTE — ASSESSMENT & PLAN NOTE
This patient again presents with chronic midline thoracic back pain. She states she has tried massage, heat packs and over-the-counter medication in order to alleviate this back pain. She has recently been given tramadol twice in the past 6 months in order to alleviate this pain which she states only somewhat worked for her. She was referred for physiatry consult and had an appointment for this in November 2017 but she did not appear for this appointment. She is requesting pain medications today.

## 2018-05-30 ENCOUNTER — OFFICE VISIT (OUTPATIENT)
Dept: MEDICAL GROUP | Facility: CLINIC | Age: 48
End: 2018-05-30
Payer: MEDICAID

## 2018-05-30 VITALS
DIASTOLIC BLOOD PRESSURE: 76 MMHG | OXYGEN SATURATION: 94 % | SYSTOLIC BLOOD PRESSURE: 124 MMHG | HEIGHT: 64 IN | BODY MASS INDEX: 34.83 KG/M2 | WEIGHT: 204 LBS | TEMPERATURE: 98.7 F | HEART RATE: 80 BPM | RESPIRATION RATE: 14 BRPM

## 2018-05-30 DIAGNOSIS — G89.29 CHRONIC MIDLINE THORACIC BACK PAIN: ICD-10-CM

## 2018-05-30 DIAGNOSIS — E03.9 HYPOTHYROIDISM, UNSPECIFIED TYPE: ICD-10-CM

## 2018-05-30 DIAGNOSIS — M54.6 CHRONIC MIDLINE THORACIC BACK PAIN: ICD-10-CM

## 2018-05-30 PROCEDURE — 99214 OFFICE O/P EST MOD 30 MIN: CPT | Performed by: PHYSICIAN ASSISTANT

## 2018-05-30 RX ORDER — LISINOPRIL 10 MG/1
10 TABLET ORAL EVERY MORNING
Qty: 90 TAB | Refills: 3 | Status: SHIPPED | OUTPATIENT
Start: 2018-05-30

## 2018-05-30 RX ORDER — LEVOTHYROXINE SODIUM 0.03 MG/1
25 TABLET ORAL
Qty: 30 TAB | Refills: 0 | Status: SHIPPED | OUTPATIENT
Start: 2018-05-30 | End: 2018-05-30 | Stop reason: SDUPTHER

## 2018-05-30 RX ORDER — CYCLOBENZAPRINE HCL 5 MG
5-10 TABLET ORAL 3 TIMES DAILY PRN
Qty: 30 TAB | Refills: 0 | Status: SHIPPED | OUTPATIENT
Start: 2018-05-30

## 2018-05-30 RX ORDER — LEVOTHYROXINE SODIUM 0.03 MG/1
25 TABLET ORAL
Qty: 90 TAB | Refills: 0 | Status: SHIPPED | OUTPATIENT
Start: 2018-05-30

## 2018-05-30 ASSESSMENT — PAIN SCALES - GENERAL: PAINLEVEL: 9=SEVERE PAIN

## 2018-05-30 NOTE — PROGRESS NOTES
Chief Complaint   Patient presents with   • Diabetes     f/v back pain       HISTORY OF PRESENT ILLNESS: Patient is a 47 y.o. female established patient who presents today for evaluation and management of:    Uncontrolled type 2 diabetes mellitus with complication, without long-term current use of insulin (CMS-[facility])  Last A1c: 9.4% 5/7/18 up from 7.7% Nov 2017   DM Medications: metformin 1000mg BID, pioglitazone 15 mg, empagliflozin 10 mg tablet daily,  Patient reports good medication compliance.   HTN: Blood pressure goal <140/<90 Yes  ACE: lisinopril 10mg daily  Hyperlipidemia: Cholesterol goal LDL <100 Yes.   Currently Rx Statin: pravastatin 10mg   Diabetic diet: Yes  Exercise: walking about 1 hour per day  Last monofilament foot exam: august 2017 Checks feet at home: Yes, no sores currently   Last Eye exam: abnormal, patient has not repeated test as was requested in February 2018   Kidney function: wnl   Microalbumin screening: wnl  Has patient received flu vaccine: No  Has patient received Hep B series:No    A1c goal <7 No  Current barriers to control include diet, and medication compliance.   Glucose monitoring frequency: TID  Fasting sugars: 160's. Post-prandial sugars: 220's  Hypoglycemic episodes No- lowest sugar was 98  Diabetic complications: retinopathy     The patient is not taking ASA every day and is not taking all other medications as prescribed. Patient denies any side effects of medication.      Chronic midline thoracic back pain  This patient again presents with chronic midline thoracic back pain. She states she has tried massage, heat packs and over-the-counter medication in order to alleviate this back pain. She has recently been given tramadol twice in the past 6 months in order to alleviate this pain which she states only somewhat worked for her. She was referred for physiatry consult and had an appointment for this in November 2017 but she did not appear for this appointment. She is requesting  pain medications for the fourth time today. She still has failed to make an appointment with a pain management provider.    Hypothyroidism  Component      Latest Ref Rng & Units 5/7/2018   TSH      0.380 - 5.330 uIU/mL 6.880 (H)   Free T-4      0.53 - 1.43 ng/dL 0.93   Although this patient's free T4 is within normal limits today, her diabetes has recently declined and she continues to maintain a BMI of 37.25 despite what she claims as a healthy diet. Her mother does take thyroid medications. She states she has severe daily lethargy which has improved with starting 25 mcg levothyroxine.       Patient Active Problem List    Diagnosis Date Noted   • Hypothyroidism 05/17/2018   • Chronic midline thoracic back pain 11/06/2017   • Obesity (BMI 30-39.9) 11/06/2017   • Uncontrolled type 2 diabetes mellitus with complication, without long-term current use of insulin (HCC) 07/25/2017   • Gastric ulcer without hemorrhage or perforation 07/25/2017       Allergies:Pcn [penicillins]    Current Outpatient Prescriptions   Medication Sig Dispense Refill   • cyclobenzaprine (FLEXERIL) 5 MG tablet Take 1-2 Tabs by mouth 3 times a day as needed. 30 Tab 0   • lisinopril (PRINIVIL) 10 MG Tab Take 1 Tab by mouth every morning. 90 Tab 3   • metformin (GLUCOPHAGE) 1000 MG tablet Take 1 Tab by mouth 2 times a day, with meals. 180 Tab 3   • levothyroxine (SYNTHROID) 25 MCG Tab Take 1 Tab by mouth Every morning on an empty stomach. 90 Tab 0   • Empagliflozin 25 MG Tab Take 1 Tab by mouth every morning with breakfast. 90 Tab 1   • pioglitazone (ACTOS) 30 MG Tab Take 1 Tab by mouth every day. 90 Tab 1   • albuterol 108 (90 Base) MCG/ACT Aero Soln inhalation aerosol Inhale 2 Puffs by mouth every 6 hours as needed for Shortness of Breath. 8.5 g 0   • benzonatate (TESSALON) 100 MG Cap Take 1 Cap by mouth 3 times a day as needed for Cough. 60 Cap 0   • tramadol (ULTRAM) 50 MG Tab Take 1 Tab by mouth every 6 hours as needed for Moderate Pain. 30 Tab  "0   • ibuprofen (MOTRIN) 800 MG Tab Take 1 Tab by mouth every 8 hours as needed for Moderate Pain. 30 Tab 0   • esomeprazole (NEXIUM) 40 MG delayed-release capsule Take 1 Cap by mouth every morning before breakfast. 90 Cap 1   • Blood Glucose Monitoring Suppl Device Meter: Dispense Device of Insurance Preference. Sig. Use as directed for blood sugar monitoring. #1. NR. 1 Device 0   • Blood Glucose Monitoring Suppl SUPPLIES Misc Insurance preferred test strips 120 Strip 3   • Lancets Misc Lancets order: Lancets for insurance preferred meter. Sig: use QAM fasting and prn ssx high or low sugar. #100 RF x 3 120 Each 2   • pravastatin (PRAVACHOL) 10 MG Tab Take 1 Tab by mouth every bedtime. 30 Tab 11     No current facility-administered medications for this visit.        Social History   Substance Use Topics   • Smoking status: Former Smoker     Types: Cigarettes     Quit date: 2009   • Smokeless tobacco: Never Used   • Alcohol use No      Comment: occasional       Family Status   Relation Status   • Brother Alive   • Mother    • Father    • Maternal Grandmother    • Brother    • Brother      Family History   Problem Relation Age of Onset   • Diabetes Father    • Cancer Father    • Hypertension Father    • Diabetes Maternal Grandmother    • Hypertension Maternal Grandmother    • Heart Attack Maternal Grandmother    • Arrythmia Brother    • Heart Attack Brother        Review of Systems: See HPI above.       Exam:  Blood pressure 124/76, pulse 80, temperature 37.1 °C (98.7 °F), resp. rate 14, height 1.626 m (5' 4\"), weight 92.5 kg (204 lb), SpO2 94 %.  Body mass index is 35.02 kg/m².  General:  Obese female in NAD  Head: is grossly normal.  Neck: Supple without masses. Thyroid is not visibly enlarged.  Pulmonary: Clear to ausculation. Normal effort. No rales, ronchi, or wheezing.  Cardiovascular: Regular rate and rhythm without murmur. Carotid and radial pulses are intact and equal " bilaterally.  Extremities: no clubbing, cyanosis, or edema.  Behavioral: Patient tends to laugh at inappropriate times, has strange demeanor and has very poor recall regarding medications.     Medical decision-making and discussion:  1. Uncontrolled type 2 diabetes mellitus with complication, without long-term current use of insulin (HCC)  Continue pioglitazone as prescribed. Continue taking all other medicaitons as prescribed and continue monitoring blood sugars as directed. The patient states she will be leaving town for approximately 2 months, returning in early August 20 18 which does not allow for frequent return to monitor her status of her diabetes.  - lisinopril (PRINIVIL) 10 MG Tab; Take 1 Tab by mouth every morning.  Dispense: 90 Tab; Refill: 3  - metformin (GLUCOPHAGE) 1000 MG tablet; Take 1 Tab by mouth 2 times a day, with meals.  Dispense: 180 Tab; Refill: 3  - Empagliflozin 25 MG Tab; Take 1 Tab by mouth every morning with breakfast.  Dispense: 90 Tab; Refill: 1    2. Chronic midline thoracic back pain  This patient was again reminded that she needs to make an appointment with the pain management specialist in order to discuss her chronic back pain.  - cyclobenzaprine (FLEXERIL) 5 MG tablet; Take 1-2 Tabs by mouth 3 times a day as needed.  Dispense: 30 Tab; Refill: 0    3. Hypothyroidism, unspecified type    - TSH+FREE T4  - levothyroxine (SYNTHROID) 25 MCG Tab; Take 1 Tab by mouth Every morning on an empty stomach.  Dispense: 90 Tab; Refill: 0      Please note that this dictation was created using voice recognition software. I have made every reasonable attempt to correct obvious errors, but I expect that there are errors of grammar and possibly content that I did not discover before finalizing the note.      Return in about 2 months (around 8/6/2018) for DM .

## 2020-11-10 NOTE — ASSESSMENT & PLAN NOTE
Component      Latest Ref Rng & Units 5/7/2018   TSH      0.380 - 5.330 uIU/mL 6.880 (H)   Free T-4      0.53 - 1.43 ng/dL 0.93   Although this patient's free T4 is within normal limits today, her diabetes has recently declined and she continues to maintain a BMI of 37.25 despite what she claims as a healthy diet. Her mother does take thyroid medications. She states she has severe daily lethargy which has improved with starting 25 mcg levothyroxine.  
Last A1c: 9.4% 5/7/18 up from 7.7% Nov 2017   DM Medications: metformin 1000mg BID, pioglitazone 15 mg, empagliflozin 10 mg tablet daily,  Patient reports good medication compliance.   HTN: Blood pressure goal <140/<90 Yes  ACE: lisinopril 10mg daily  Hyperlipidemia: Cholesterol goal LDL <100 Yes.   Currently Rx Statin: pravastatin 10mg   Diabetic diet: Yes  Exercise: walking about 1 hour per day  Last monofilament foot exam: august 2017 Checks feet at home: Yes, no sores currently   Last Eye exam: abnormal, patient has not repeated test as was requested in February 2018   Kidney function: wnl   Microalbumin screening: wnl  Has patient received flu vaccine: No  Has patient received Hep B series:No    A1c goal <7 No  Current barriers to control include diet, and medication compliance.   Glucose monitoring frequency: TID  Fasting sugars: 160's. Post-prandial sugars: 220's  Hypoglycemic episodes No- lowest sugar was 98  Diabetic complications: retinopathy     The patient is not taking ASA every day and is not taking all other medications as prescribed. Patient denies any side effects of medication.    
This patient again presents with chronic midline thoracic back pain. She states she has tried massage, heat packs and over-the-counter medication in order to alleviate this back pain. She has recently been given tramadol twice in the past 6 months in order to alleviate this pain which she states only somewhat worked for her. She was referred for physiatry consult and had an appointment for this in November 2017 but she did not appear for this appointment. She is requesting pain medications for the fourth time today. She still has failed to make an appointment with a pain management provider.  
[facility]